# Patient Record
Sex: MALE | Race: WHITE | NOT HISPANIC OR LATINO | Employment: OTHER | URBAN - METROPOLITAN AREA
[De-identification: names, ages, dates, MRNs, and addresses within clinical notes are randomized per-mention and may not be internally consistent; named-entity substitution may affect disease eponyms.]

---

## 2023-03-17 ENCOUNTER — TELEPHONE (OUTPATIENT)
Dept: PRIMARY CARE | Facility: CLINIC | Age: 68
End: 2023-03-17
Payer: MEDICARE

## 2023-03-17 DIAGNOSIS — E11.69 TYPE 2 DIABETES MELLITUS WITH OTHER SPECIFIED COMPLICATION, WITHOUT LONG-TERM CURRENT USE OF INSULIN (MULTI): Primary | ICD-10-CM

## 2023-03-17 PROBLEM — E11.9 DIABETES (MULTI): Status: ACTIVE | Noted: 2023-03-17

## 2023-03-17 PROBLEM — E11.319 DIABETIC RETINOPATHY (MULTI): Status: ACTIVE | Noted: 2023-03-17

## 2023-03-17 PROBLEM — R80.9 MICROALBUMINURIA: Status: ACTIVE | Noted: 2023-03-17

## 2023-03-17 RX ORDER — BLOOD-GLUCOSE,RECEIVER,CONT
EACH MISCELLANEOUS
Qty: 1 EACH | Refills: 0 | Status: SHIPPED | OUTPATIENT
Start: 2023-03-17 | End: 2023-04-12

## 2023-03-17 RX ORDER — BLOOD-GLUCOSE TRANSMITTER
EACH MISCELLANEOUS
Qty: 1 EACH | Refills: 0 | Status: SHIPPED | OUTPATIENT
Start: 2023-03-17 | End: 2023-04-12

## 2023-03-17 RX ORDER — BLOOD-GLUCOSE SENSOR
1 EACH MISCELLANEOUS
Qty: 1 EACH | Refills: 11 | Status: SHIPPED | OUTPATIENT
Start: 2023-03-17 | End: 2023-04-12

## 2023-03-17 NOTE — TELEPHONE ENCOUNTER
Pt calling for a [facility] to [facility] [address] [place] for the continuous blood sugar meter that you can stick to your arm.  He does not know if this is approved by his insurance or not but would like the meter and supplies called in.  He is not sure what supplies come with this meter.  thanks

## 2023-03-21 ENCOUNTER — TELEPHONE (OUTPATIENT)
Dept: PRIMARY CARE | Facility: CLINIC | Age: 68
End: 2023-03-21
Payer: MEDICARE

## 2023-03-21 NOTE — TELEPHONE ENCOUNTER
Moni patient   Penny calling for new rx order    Free style cecilio 2 sensors and reader    Dexcom is not covered and this is, so member is ok having it called in.  #90 day supply     Call into [facility] [address] [facility] phone [phone]

## 2023-03-27 DIAGNOSIS — E11.319 DIABETIC RETINOPATHY ASSOCIATED WITH TYPE 2 DIABETES MELLITUS, MACULAR EDEMA PRESENCE UNSPECIFIED, UNSPECIFIED LATERALITY, UNSPECIFIED RETINOPATHY SEVERITY (MULTI): Primary | ICD-10-CM

## 2023-03-27 DIAGNOSIS — E11.69 TYPE 2 DIABETES MELLITUS WITH OTHER SPECIFIED COMPLICATION, WITHOUT LONG-TERM CURRENT USE OF INSULIN (MULTI): ICD-10-CM

## 2023-03-27 RX ORDER — FLASH GLUCOSE SENSOR
KIT MISCELLANEOUS
Qty: 1 EACH | Refills: 0 | Status: SHIPPED | OUTPATIENT
Start: 2023-03-27 | End: 2023-04-12

## 2023-03-29 ENCOUNTER — TELEPHONE (OUTPATIENT)
Dept: PRIMARY CARE | Facility: CLINIC | Age: 68
End: 2023-03-29
Payer: MEDICARE

## 2023-03-29 RX ORDER — HYDROCHLOROTHIAZIDE 12.5 MG/1
12.5 TABLET ORAL DAILY
COMMUNITY
Start: 2023-02-16 | End: 2023-04-07 | Stop reason: SDUPTHER

## 2023-03-29 RX ORDER — AMLODIPINE AND BENAZEPRIL HYDROCHLORIDE 10; 40 MG/1; MG/1
1 CAPSULE ORAL DAILY
COMMUNITY
End: 2023-07-03 | Stop reason: SDUPTHER

## 2023-03-29 RX ORDER — METFORMIN HYDROCHLORIDE 1000 MG/1
1 TABLET ORAL 2 TIMES DAILY
COMMUNITY
Start: 2021-12-17 | End: 2023-07-03 | Stop reason: SDUPTHER

## 2023-03-29 RX ORDER — SILDENAFIL 100 MG/1
100 TABLET, FILM COATED ORAL AS NEEDED
COMMUNITY
End: 2023-04-20 | Stop reason: SDUPTHER

## 2023-03-29 RX ORDER — HYDROCORTISONE 25 MG/G
1 CREAM TOPICAL 2 TIMES DAILY
COMMUNITY
Start: 2023-02-16 | End: 2023-07-03 | Stop reason: ALTCHOICE

## 2023-03-29 NOTE — TELEPHONE ENCOUNTER
The pharm lm asking if you could change the glucose kit to the   Freestyle cecilio 2 sensor. This will go with his reader and is covered

## 2023-04-03 LAB
ALANINE AMINOTRANSFERASE (SGPT) (U/L) IN SER/PLAS: 8 U/L (ref 10–52)
ALBUMIN (G/DL) IN SER/PLAS: 4 G/DL (ref 3.4–5)
ALBUMIN (MG/L) IN URINE: 107.7 MG/L
ALBUMIN/CREATININE (UG/MG) IN URINE: 170.7 UG/MG CRT (ref 0–30)
ALKALINE PHOSPHATASE (U/L) IN SER/PLAS: 46 U/L (ref 33–136)
ANION GAP IN SER/PLAS: 10 MMOL/L (ref 10–20)
ASPARTATE AMINOTRANSFERASE (SGOT) (U/L) IN SER/PLAS: 12 U/L (ref 9–39)
BILIRUBIN TOTAL (MG/DL) IN SER/PLAS: 0.5 MG/DL (ref 0–1.2)
CALCIUM (MG/DL) IN SER/PLAS: 8.9 MG/DL (ref 8.6–10.3)
CARBON DIOXIDE, TOTAL (MMOL/L) IN SER/PLAS: 27 MMOL/L (ref 21–32)
CHLORIDE (MMOL/L) IN SER/PLAS: 99 MMOL/L (ref 98–107)
CHOLESTEROL (MG/DL) IN SER/PLAS: 201 MG/DL (ref 0–199)
CHOLESTEROL IN HDL (MG/DL) IN SER/PLAS: 33.1 MG/DL
CHOLESTEROL/HDL RATIO: 6.1
CREATININE (MG/DL) IN SER/PLAS: 1.43 MG/DL (ref 0.5–1.3)
CREATININE (MG/DL) IN URINE: 63.1 MG/DL (ref 20–370)
ERYTHROCYTE DISTRIBUTION WIDTH (RATIO) BY AUTOMATED COUNT: 12.9 % (ref 11.5–14.5)
ERYTHROCYTE MEAN CORPUSCULAR HEMOGLOBIN CONCENTRATION (G/DL) BY AUTOMATED: 33.6 G/DL (ref 32–36)
ERYTHROCYTE MEAN CORPUSCULAR VOLUME (FL) BY AUTOMATED COUNT: 85 FL (ref 80–100)
ERYTHROCYTES (10*6/UL) IN BLOOD BY AUTOMATED COUNT: 3.91 X10E12/L (ref 4.5–5.9)
ESTIMATED AVERAGE GLUCOSE FOR HBA1C: 171 MG/DL
GFR MALE: 53 ML/MIN/1.73M2
GLUCOSE (MG/DL) IN SER/PLAS: 177 MG/DL (ref 74–99)
HEMATOCRIT (%) IN BLOOD BY AUTOMATED COUNT: 33.3 % (ref 41–52)
HEMOGLOBIN (G/DL) IN BLOOD: 11.2 G/DL (ref 13.5–17.5)
HEMOGLOBIN A1C/HEMOGLOBIN TOTAL IN BLOOD: 7.6 %
LDL: 117 MG/DL (ref 0–99)
LEUKOCYTES (10*3/UL) IN BLOOD BY AUTOMATED COUNT: 6.8 X10E9/L (ref 4.4–11.3)
NON HDL CHOLESTEROL: 168 MG/DL
PLATELETS (10*3/UL) IN BLOOD AUTOMATED COUNT: 274 X10E9/L (ref 150–450)
POTASSIUM (MMOL/L) IN SER/PLAS: 4.4 MMOL/L (ref 3.5–5.3)
PROTEIN TOTAL: 6.6 G/DL (ref 6.4–8.2)
SODIUM (MMOL/L) IN SER/PLAS: 132 MMOL/L (ref 136–145)
TRIGLYCERIDE (MG/DL) IN SER/PLAS: 257 MG/DL (ref 0–149)
UREA NITROGEN (MG/DL) IN SER/PLAS: 28 MG/DL (ref 6–23)
VLDL: 51 MG/DL (ref 0–40)

## 2023-04-07 DIAGNOSIS — I10 PRIMARY HYPERTENSION: ICD-10-CM

## 2023-04-07 DIAGNOSIS — E11.22 TYPE 2 DIABETES MELLITUS WITH STAGE 3 CHRONIC KIDNEY DISEASE, WITHOUT LONG-TERM CURRENT USE OF INSULIN, UNSPECIFIED WHETHER STAGE 3A OR 3B CKD (MULTI): Primary | ICD-10-CM

## 2023-04-07 DIAGNOSIS — N18.30 TYPE 2 DIABETES MELLITUS WITH STAGE 3 CHRONIC KIDNEY DISEASE, WITHOUT LONG-TERM CURRENT USE OF INSULIN, UNSPECIFIED WHETHER STAGE 3A OR 3B CKD (MULTI): Primary | ICD-10-CM

## 2023-04-07 RX ORDER — PIOGLITAZONEHYDROCHLORIDE 15 MG/1
15 TABLET ORAL DAILY
Qty: 90 TABLET | Refills: 3 | Status: SHIPPED | OUTPATIENT
Start: 2023-04-07 | End: 2023-04-12 | Stop reason: SINTOL

## 2023-04-07 RX ORDER — HYDROCHLOROTHIAZIDE 25 MG/1
25 TABLET ORAL DAILY
Qty: 90 TABLET | Refills: 3 | Status: SHIPPED | OUTPATIENT
Start: 2023-04-07 | End: 2023-04-20 | Stop reason: SDUPTHER

## 2023-04-07 NOTE — PROGRESS NOTES
Hydrochlorothiazide working well, feet less swollen bp better    Sbp still in 150's      Will increase hydrochlorothiazide    Will add pioglitazone for diabetes  I spoke to patient today and discussed test results. Questions were answered to their satisfaction. They were told to call or make an appointment if they have any concerns.  Mattie Montenegro D.O.

## 2023-04-12 ENCOUNTER — TELEPHONE (OUTPATIENT)
Dept: PRIMARY CARE | Facility: CLINIC | Age: 68
End: 2023-04-12
Payer: MEDICARE

## 2023-04-12 DIAGNOSIS — E11.39 TYPE 2 DIABETES MELLITUS WITH OTHER OPHTHALMIC COMPLICATION, WITHOUT LONG-TERM CURRENT USE OF INSULIN (MULTI): Primary | ICD-10-CM

## 2023-04-12 NOTE — TELEPHONE ENCOUNTER
Pt called c/o bilateral leg swelling since starting new meds. Pt thinks R leg is worse than L leg. Pt can be reached at [phone]

## 2023-04-17 ENCOUNTER — TELEPHONE (OUTPATIENT)
Dept: PRIMARY CARE | Facility: CLINIC | Age: 68
End: 2023-04-17
Payer: MEDICARE

## 2023-04-20 ENCOUNTER — OFFICE VISIT (OUTPATIENT)
Dept: PRIMARY CARE | Facility: CLINIC | Age: 68
End: 2023-04-20
Payer: MEDICARE

## 2023-04-20 VITALS
DIASTOLIC BLOOD PRESSURE: 96 MMHG | TEMPERATURE: 97.3 F | HEART RATE: 90 BPM | WEIGHT: 242 LBS | BODY MASS INDEX: 32.82 KG/M2 | SYSTOLIC BLOOD PRESSURE: 170 MMHG

## 2023-04-20 DIAGNOSIS — N18.31 STAGE 3A CHRONIC KIDNEY DISEASE (MULTI): ICD-10-CM

## 2023-04-20 DIAGNOSIS — I10 PRIMARY HYPERTENSION: Primary | ICD-10-CM

## 2023-04-20 DIAGNOSIS — E11.39 TYPE 2 DIABETES MELLITUS WITH OTHER OPHTHALMIC COMPLICATION, WITHOUT LONG-TERM CURRENT USE OF INSULIN (MULTI): ICD-10-CM

## 2023-04-20 DIAGNOSIS — E11.319 DIABETIC RETINOPATHY ASSOCIATED WITH TYPE 2 DIABETES MELLITUS, MACULAR EDEMA PRESENCE UNSPECIFIED, UNSPECIFIED LATERALITY, UNSPECIFIED RETINOPATHY SEVERITY (MULTI): ICD-10-CM

## 2023-04-20 DIAGNOSIS — R80.9 MICROALBUMINURIA: ICD-10-CM

## 2023-04-20 DIAGNOSIS — N52.9 ERECTILE DYSFUNCTION, UNSPECIFIED ERECTILE DYSFUNCTION TYPE: ICD-10-CM

## 2023-04-20 DIAGNOSIS — E11.319 TYPE 2 DIABETES MELLITUS WITH RETINOPATHY, WITHOUT LONG-TERM CURRENT USE OF INSULIN, MACULAR EDEMA PRESENCE UNSPECIFIED, UNSPECIFIED LATERALITY, UNSPECIFIED RETINOPATHY SEVERITY (MULTI): ICD-10-CM

## 2023-04-20 LAB — POC FINGERSTICK BLOOD GLUCOSE: 258 MG/DL (ref 70–100)

## 2023-04-20 PROCEDURE — 3051F HG A1C>EQUAL 7.0%<8.0%: CPT | Performed by: FAMILY MEDICINE

## 2023-04-20 PROCEDURE — 1160F RVW MEDS BY RX/DR IN RCRD: CPT | Performed by: FAMILY MEDICINE

## 2023-04-20 PROCEDURE — 99214 OFFICE O/P EST MOD 30 MIN: CPT | Performed by: FAMILY MEDICINE

## 2023-04-20 PROCEDURE — 1159F MED LIST DOCD IN RCRD: CPT | Performed by: FAMILY MEDICINE

## 2023-04-20 PROCEDURE — 3077F SYST BP >= 140 MM HG: CPT | Performed by: FAMILY MEDICINE

## 2023-04-20 PROCEDURE — 3080F DIAST BP >= 90 MM HG: CPT | Performed by: FAMILY MEDICINE

## 2023-04-20 PROCEDURE — 82962 GLUCOSE BLOOD TEST: CPT | Performed by: FAMILY MEDICINE

## 2023-04-20 RX ORDER — PIOGLITAZONEHYDROCHLORIDE 30 MG/1
1 TABLET ORAL DAILY
COMMUNITY
Start: 2022-05-13 | End: 2023-04-20 | Stop reason: SINTOL

## 2023-04-20 RX ORDER — HYDROCHLOROTHIAZIDE 25 MG/1
25 TABLET ORAL DAILY
Qty: 90 TABLET | Refills: 3 | Status: SHIPPED | OUTPATIENT
Start: 2023-04-20 | End: 2023-07-03 | Stop reason: SDUPTHER

## 2023-04-20 RX ORDER — SILDENAFIL 100 MG/1
100 TABLET, FILM COATED ORAL AS NEEDED
Qty: 10 TABLET | Refills: 2 | Status: SHIPPED | OUTPATIENT
Start: 2023-04-20

## 2023-04-20 NOTE — PROGRESS NOTES
Subjective    David Ortiz is a 67 y.o. male who presents for Medication Problem (Discuss meds).    Le edema with actos, told top stop that and start jardiance, wanted ot try samples first before filling rx but we have no samples, will plan to fill rx and atrt that med    Stopped hydrochlorothiazide too, thought it was also causing swelling, will retarat         Objective   BP (!) 170/96   Pulse 90   Temp 36.3 °C (97.3 °F)   Wt 110 kg (242 lb)   BMI 32.82 kg/m²   See 4/3/ labs  Physical Exam  Vitals reviewed.   Constitutional:       General: He is not in acute distress.     Appearance: Normal appearance.   HENT:      Head: Normocephalic and atraumatic.   Eyes:      General: No scleral icterus.     Conjunctiva/sclera: Conjunctivae normal.   Cardiovascular:      Rate and Rhythm: Normal rate and regular rhythm.      Heart sounds: No murmur heard.  Pulmonary:      Effort: Pulmonary effort is normal.      Breath sounds: No wheezing, rhonchi or rales.   Musculoskeletal:      Right lower leg: No edema.      Left lower leg: No edema.   Skin:     General: Skin is warm and dry.      Findings: No rash.   Neurological:      General: No focal deficit present.      Mental Status: He is alert.      Gait: Gait normal.   Psychiatric:         Mood and Affect: Mood normal.         Behavior: Behavior normal.         Assessment/Plan   Problem List Items Addressed This Visit          Circulatory    Primary hypertension - Primary    Relevant Medications    hydroCHLOROthiazide (HYDRODiuril) 25 mg tablet       Genitourinary    Stage 3a chronic kidney disease       Endocrine/Metabolic    Diabetes (CMS/[facility])    Relevant Medications    empagliflozin (Jardiance) 25 mg    Other Relevant Orders    POCT Fingerstick Glucose manually resulted (Completed)    Referral to Diabetic Education    Diabetic retinopathy (CMS/[facility])       Other    Microalbuminuria     Other Visit Diagnoses       Erectile dysfunction, unspecified erectile dysfunction type         Relevant Medications    sildenafil (Viagra) 100 mg tablet        Medication risks and benefits discussed.  Patient advised to follow up in 3mo as needed or if any concerns arise.

## 2023-05-09 ENCOUNTER — TELEPHONE (OUTPATIENT)
Dept: PRIMARY CARE | Facility: CLINIC | Age: 68
End: 2023-05-09
Payer: MEDICARE

## 2023-05-09 DIAGNOSIS — E11.69 TYPE 2 DIABETES MELLITUS WITH OTHER SPECIFIED COMPLICATION, WITHOUT LONG-TERM CURRENT USE OF INSULIN (MULTI): ICD-10-CM

## 2023-05-09 DIAGNOSIS — E11.319 DIABETIC RETINOPATHY ASSOCIATED WITH TYPE 2 DIABETES MELLITUS, MACULAR EDEMA PRESENCE UNSPECIFIED, UNSPECIFIED LATERALITY, UNSPECIFIED RETINOPATHY SEVERITY (MULTI): ICD-10-CM

## 2023-05-09 DIAGNOSIS — I10 PRIMARY HYPERTENSION: ICD-10-CM

## 2023-05-09 NOTE — TELEPHONE ENCOUNTER
His insurance asked if you could send a 90d script for the   Freestyle Cindy 2 sensors    Im not sure how often you change them so you will need to fix directions :)

## 2023-05-16 ENCOUNTER — TELEPHONE (OUTPATIENT)
Dept: PRIMARY CARE | Facility: CLINIC | Age: 68
End: 2023-05-16
Payer: MEDICARE

## 2023-05-16 NOTE — TELEPHONE ENCOUNTER
Pt request Free Style glucose sensor be sent to local pharm: [facility] [place]. He left a message last week but it still has not been called in. Once rx is sent to pharm pt would like Sophie to help him stick it on his arm.

## 2023-05-19 ENCOUNTER — OFFICE VISIT (OUTPATIENT)
Dept: PRIMARY CARE | Facility: CLINIC | Age: 68
End: 2023-05-19
Payer: MEDICARE

## 2023-05-19 VITALS
OXYGEN SATURATION: 93 % | SYSTOLIC BLOOD PRESSURE: 138 MMHG | HEART RATE: 87 BPM | TEMPERATURE: 98 F | WEIGHT: 233 LBS | DIASTOLIC BLOOD PRESSURE: 82 MMHG | HEIGHT: 72 IN | BODY MASS INDEX: 31.56 KG/M2

## 2023-05-19 DIAGNOSIS — E11.319 DIABETIC RETINOPATHY ASSOCIATED WITH TYPE 2 DIABETES MELLITUS, MACULAR EDEMA PRESENCE UNSPECIFIED, UNSPECIFIED LATERALITY, UNSPECIFIED RETINOPATHY SEVERITY (MULTI): ICD-10-CM

## 2023-05-19 DIAGNOSIS — M54.42 ACUTE LEFT-SIDED LOW BACK PAIN WITH LEFT-SIDED SCIATICA: Primary | ICD-10-CM

## 2023-05-19 DIAGNOSIS — E11.69 TYPE 2 DIABETES MELLITUS WITH OTHER SPECIFIED COMPLICATION, WITHOUT LONG-TERM CURRENT USE OF INSULIN (MULTI): ICD-10-CM

## 2023-05-19 DIAGNOSIS — I10 PRIMARY HYPERTENSION: ICD-10-CM

## 2023-05-19 PROCEDURE — 3075F SYST BP GE 130 - 139MM HG: CPT | Performed by: STUDENT IN AN ORGANIZED HEALTH CARE EDUCATION/TRAINING PROGRAM

## 2023-05-19 PROCEDURE — 4010F ACE/ARB THERAPY RXD/TAKEN: CPT | Performed by: STUDENT IN AN ORGANIZED HEALTH CARE EDUCATION/TRAINING PROGRAM

## 2023-05-19 PROCEDURE — 3051F HG A1C>EQUAL 7.0%<8.0%: CPT | Performed by: STUDENT IN AN ORGANIZED HEALTH CARE EDUCATION/TRAINING PROGRAM

## 2023-05-19 PROCEDURE — 1159F MED LIST DOCD IN RCRD: CPT | Performed by: STUDENT IN AN ORGANIZED HEALTH CARE EDUCATION/TRAINING PROGRAM

## 2023-05-19 PROCEDURE — 1160F RVW MEDS BY RX/DR IN RCRD: CPT | Performed by: STUDENT IN AN ORGANIZED HEALTH CARE EDUCATION/TRAINING PROGRAM

## 2023-05-19 PROCEDURE — 99213 OFFICE O/P EST LOW 20 MIN: CPT | Performed by: STUDENT IN AN ORGANIZED HEALTH CARE EDUCATION/TRAINING PROGRAM

## 2023-05-19 PROCEDURE — 3079F DIAST BP 80-89 MM HG: CPT | Performed by: STUDENT IN AN ORGANIZED HEALTH CARE EDUCATION/TRAINING PROGRAM

## 2023-05-19 RX ORDER — AMLODIPINE BESYLATE 10 MG/1
TABLET ORAL
COMMUNITY
Start: 2021-11-01 | End: 2023-07-03 | Stop reason: ALTCHOICE

## 2023-05-19 RX ORDER — LISINOPRIL 5 MG/1
TABLET ORAL
COMMUNITY
Start: 2021-12-17 | End: 2023-07-03 | Stop reason: ALTCHOICE

## 2023-05-19 RX ORDER — ALPRAZOLAM 0.5 MG/1
TABLET ORAL
COMMUNITY
Start: 2008-01-30 | End: 2023-12-19 | Stop reason: WASHOUT

## 2023-05-19 RX ORDER — BACLOFEN 10 MG/1
10 TABLET ORAL 2 TIMES DAILY PRN
Qty: 14 TABLET | Refills: 0 | Status: SHIPPED | OUTPATIENT
Start: 2023-05-19 | End: 2023-07-03 | Stop reason: ALTCHOICE

## 2023-05-19 RX ORDER — VALSARTAN AND HYDROCHLOROTHIAZIDE 160; 12.5 MG/1; MG/1
TABLET, FILM COATED ORAL
COMMUNITY
Start: 2021-11-03 | End: 2023-07-03 | Stop reason: ALTCHOICE

## 2023-05-19 RX ORDER — COLCHICINE 0.6 MG/1
TABLET ORAL
COMMUNITY
Start: 2008-06-09 | End: 2023-12-19 | Stop reason: WASHOUT

## 2023-05-19 RX ORDER — MELOXICAM 15 MG/1
15 TABLET ORAL DAILY PRN
Qty: 14 TABLET | Refills: 0 | Status: SHIPPED | OUTPATIENT
Start: 2023-05-19 | End: 2023-06-02

## 2023-05-19 ASSESSMENT — ENCOUNTER SYMPTOMS
VOMITING: 0
CHILLS: 0
LIGHT-HEADEDNESS: 0
COUGH: 0
AGITATION: 0
NECK STIFFNESS: 0
DIZZINESS: 0
ADENOPATHY: 0
NECK PAIN: 0
NAUSEA: 0
HEADACHES: 0
BRUISES/BLEEDS EASILY: 0
BACK PAIN: 1
DYSURIA: 0
BLOOD IN STOOL: 0
FLANK PAIN: 0
HEMATURIA: 0
WHEEZING: 0
FATIGUE: 0
WEAKNESS: 0
ABDOMINAL PAIN: 0
SHORTNESS OF BREATH: 0
NUMBNESS: 0
FEVER: 0
CONFUSION: 0
DIARRHEA: 0
CONSTIPATION: 0

## 2023-05-19 NOTE — PROGRESS NOTES
I reviewed with the resident the medical history and the resident’s findings on physical examination.  I discussed with the resident the patient’s diagnosis and concur with the treatment plan as documented in the resident note.     Mansoor Dickinson MD

## 2023-05-19 NOTE — PROGRESS NOTES
Subjective   Patient ID: David Ortiz is a 68 y.o. male who presents for Back Pain (Back pain x 3 weeks/Walking 8/10 pain /).    Patient presenting for left-sided low back pain that started roughly 3 weeks ago.  He states that he experienced a similar back pain roughly 2 months ago that self resolved after 3 weeks.  States pain is 8/10 at its worst, only present when standing and walking for long periods of time.  He does state pain will radiate down the back of his left leg proximal to his knee.  Denies any saddle paresthesias, bowel/bladder incontinence, or numbness/tingling/weakness in bilateral lower extremities.  He states that 2 months ago he was lifting a box of water bottles when he initially felt this back pain, then had similar back pain after he fell off of a chair, catching himself.  Denies any blunt trauma to his back.  Patient has not been taking any medications for his pain.         Review of Systems   Constitutional:  Negative for chills, fatigue and fever.   Eyes:  Negative for visual disturbance.   Respiratory:  Negative for cough, shortness of breath and wheezing.    Cardiovascular:  Negative for chest pain and leg swelling.   Gastrointestinal:  Negative for abdominal pain, blood in stool, constipation, diarrhea, nausea and vomiting.   Endocrine: Negative for polyuria.   Genitourinary:  Negative for decreased urine volume, dysuria, flank pain and hematuria.   Musculoskeletal:  Positive for back pain. Negative for gait problem, neck pain and neck stiffness.   Skin:  Negative for rash.   Allergic/Immunologic: Negative for environmental allergies, food allergies and immunocompromised state.   Neurological:  Negative for dizziness, weakness, light-headedness, numbness and headaches.   Hematological:  Negative for adenopathy. Does not bruise/bleed easily.   Psychiatric/Behavioral:  Negative for agitation, behavioral problems and confusion.    All other systems reviewed and are  negative.      Objective   /82 (BP Location: Right arm, Patient Position: Sitting, BP Cuff Size: Adult)   Pulse 87   Temp 36.7 °C (98 °F) (Temporal)   Ht 1.829 m (6')   Wt 106 kg (233 lb)   SpO2 93%   BMI 31.60 kg/m²     Physical Exam  Vitals and nursing note reviewed.   Constitutional:       General: He is not in acute distress.     Appearance: Normal appearance. He is not toxic-appearing.   HENT:      Head: Normocephalic and atraumatic.      Right Ear: External ear normal.      Left Ear: External ear normal.      Nose: Nose normal.      Mouth/Throat:      Mouth: Mucous membranes are moist.      Pharynx: Oropharynx is clear.   Eyes:      Extraocular Movements: Extraocular movements intact.      Conjunctiva/sclera: Conjunctivae normal.      Pupils: Pupils are equal, round, and reactive to light.   Cardiovascular:      Rate and Rhythm: Normal rate and regular rhythm.      Pulses: Normal pulses.      Heart sounds: No murmur heard.  Pulmonary:      Effort: Pulmonary effort is normal.      Breath sounds: Normal breath sounds.   Abdominal:      General: Abdomen is flat. There is no distension.      Palpations: Abdomen is soft.   Musculoskeletal:         General: Normal range of motion.      Cervical back: Normal range of motion and neck supple. No rigidity or tenderness.      Thoracic back: No tenderness or bony tenderness. Normal range of motion.      Lumbar back: No spasms, tenderness or bony tenderness. Normal range of motion. Negative right straight leg raise test and negative left straight leg raise test.      Right lower leg: No edema.      Left lower leg: No edema.   Skin:     General: Skin is warm and dry.      Coloration: Skin is not jaundiced.   Neurological:      General: No focal deficit present.      Mental Status: He is alert and oriented to person, place, and time.      Sensory: No sensory deficit.      Motor: No weakness.   Psychiatric:         Mood and Affect: Mood normal.         Behavior:  Behavior normal.         Thought Content: Thought content normal.         Judgment: Judgment normal.         Assessment/Plan   Problem List Items Addressed This Visit    None  Visit Diagnoses       Acute left-sided low back pain with left-sided sciatica    -  Primary    Relevant Medications    meloxicam (Mobic) 15 mg tablet    baclofen (Lioresal) 10 mg tablet        Left-sided low back pain with associated sciatic pain x3 weeks.  Physical exam unremarkable, negative straight leg raise bilaterally, and unable to elicit low back pain on exam, though patient states it is present with long periods of activity while standing.  No midline spinal tenderness.  Given unremarkable exam, no history of blunt trauma, will defer x-ray of spine at this time.  Will start on anti-inflammatories with meloxicam to be taken as needed in addition to muscle relaxer baclofen as needed.  Home exercises for spine conditioning provided and encourage patient to do these exercises daily.  Plan for follow-up in 2 to 4 weeks if no improvement of pain.  We will consider obtaining x-ray of lumbar spine at that time.  Suspect left lumbar muscle strain at this time.  Advised patient go to the ER if experiencing any bowel/bladder incontinence, saddle paresthesias, or any other new or concerning symptoms.

## 2023-07-03 ENCOUNTER — OFFICE VISIT (OUTPATIENT)
Dept: PRIMARY CARE | Facility: CLINIC | Age: 68
End: 2023-07-03
Payer: MEDICARE

## 2023-07-03 VITALS
TEMPERATURE: 98.1 F | SYSTOLIC BLOOD PRESSURE: 155 MMHG | HEIGHT: 72 IN | BODY MASS INDEX: 31.18 KG/M2 | HEART RATE: 77 BPM | DIASTOLIC BLOOD PRESSURE: 85 MMHG | WEIGHT: 230.2 LBS

## 2023-07-03 DIAGNOSIS — Z00.00 ROUTINE GENERAL MEDICAL EXAMINATION AT HEALTH CARE FACILITY: ICD-10-CM

## 2023-07-03 DIAGNOSIS — R80.9 MICROALBUMINURIA: ICD-10-CM

## 2023-07-03 DIAGNOSIS — E11.39 TYPE 2 DIABETES MELLITUS WITH OTHER OPHTHALMIC COMPLICATION, WITHOUT LONG-TERM CURRENT USE OF INSULIN (MULTI): ICD-10-CM

## 2023-07-03 DIAGNOSIS — I10 PRIMARY HYPERTENSION: ICD-10-CM

## 2023-07-03 DIAGNOSIS — E11.319 TYPE 2 DIABETES MELLITUS WITH RETINOPATHY, WITHOUT LONG-TERM CURRENT USE OF INSULIN, MACULAR EDEMA PRESENCE UNSPECIFIED, UNSPECIFIED LATERALITY, UNSPECIFIED RETINOPATHY SEVERITY (MULTI): Primary | ICD-10-CM

## 2023-07-03 DIAGNOSIS — E11.319 DIABETIC RETINOPATHY ASSOCIATED WITH TYPE 2 DIABETES MELLITUS, MACULAR EDEMA PRESENCE UNSPECIFIED, UNSPECIFIED LATERALITY, UNSPECIFIED RETINOPATHY SEVERITY (MULTI): ICD-10-CM

## 2023-07-03 DIAGNOSIS — N18.31 STAGE 3A CHRONIC KIDNEY DISEASE (MULTI): ICD-10-CM

## 2023-07-03 LAB — POC HEMOGLOBIN A1C: 6.8 % (ref 4.2–6.5)

## 2023-07-03 PROCEDURE — G0439 PPPS, SUBSEQ VISIT: HCPCS | Performed by: FAMILY MEDICINE

## 2023-07-03 PROCEDURE — 3079F DIAST BP 80-89 MM HG: CPT | Performed by: FAMILY MEDICINE

## 2023-07-03 PROCEDURE — 3077F SYST BP >= 140 MM HG: CPT | Performed by: FAMILY MEDICINE

## 2023-07-03 PROCEDURE — 1160F RVW MEDS BY RX/DR IN RCRD: CPT | Performed by: FAMILY MEDICINE

## 2023-07-03 PROCEDURE — 1170F FXNL STATUS ASSESSED: CPT | Performed by: FAMILY MEDICINE

## 2023-07-03 PROCEDURE — 1036F TOBACCO NON-USER: CPT | Performed by: FAMILY MEDICINE

## 2023-07-03 PROCEDURE — 99397 PER PM REEVAL EST PAT 65+ YR: CPT | Performed by: FAMILY MEDICINE

## 2023-07-03 PROCEDURE — 1159F MED LIST DOCD IN RCRD: CPT | Performed by: FAMILY MEDICINE

## 2023-07-03 PROCEDURE — 3051F HG A1C>EQUAL 7.0%<8.0%: CPT | Performed by: FAMILY MEDICINE

## 2023-07-03 PROCEDURE — 83036 HEMOGLOBIN GLYCOSYLATED A1C: CPT | Performed by: FAMILY MEDICINE

## 2023-07-03 RX ORDER — METFORMIN HYDROCHLORIDE 1000 MG/1
1000 TABLET ORAL 2 TIMES DAILY
Qty: 180 TABLET | Refills: 3 | Status: SHIPPED | OUTPATIENT
Start: 2023-07-03 | End: 2023-07-03 | Stop reason: SDUPTHER

## 2023-07-03 RX ORDER — METFORMIN HYDROCHLORIDE 1000 MG/1
1000 TABLET ORAL DAILY
Qty: 90 TABLET | Refills: 3 | Status: SHIPPED | OUTPATIENT
Start: 2023-07-03 | End: 2023-08-15 | Stop reason: ALTCHOICE

## 2023-07-03 RX ORDER — AMLODIPINE AND BENAZEPRIL HYDROCHLORIDE 10; 40 MG/1; MG/1
1 CAPSULE ORAL DAILY
Qty: 90 CAPSULE | Refills: 3 | Status: SHIPPED | OUTPATIENT
Start: 2023-07-03 | End: 2023-12-19 | Stop reason: SDUPTHER

## 2023-07-03 RX ORDER — HYDROCHLOROTHIAZIDE 50 MG/1
50 TABLET ORAL DAILY
Qty: 90 TABLET | Refills: 3 | Status: SHIPPED | OUTPATIENT
Start: 2023-07-03 | End: 2023-12-19 | Stop reason: SDUPTHER

## 2023-07-03 ASSESSMENT — ACTIVITIES OF DAILY LIVING (ADL)
GROCERY_SHOPPING: INDEPENDENT
BATHING: INDEPENDENT
DRESSING: INDEPENDENT
MANAGING_FINANCES: INDEPENDENT
TAKING_MEDICATION: INDEPENDENT
DOING_HOUSEWORK: INDEPENDENT

## 2023-07-03 ASSESSMENT — PATIENT HEALTH QUESTIONNAIRE - PHQ9
2. FEELING DOWN, DEPRESSED OR HOPELESS: NOT AT ALL
1. LITTLE INTEREST OR PLEASURE IN DOING THINGS: NOT AT ALL
SUM OF ALL RESPONSES TO PHQ9 QUESTIONS 1 AND 2: 0

## 2023-07-03 NOTE — PROGRESS NOTES
Subjective   Reason for Visit: David Ortiz is an 68 y.o. male here for a Medicare Wellness visit.          Reviewed all medications by prescribing practitioner or clinical pharmacist (such as prescriptions, OTCs, herbal therapies and supplements) and documented in the medical record.    Had back pain, went to chiropractor and it helped a lot    Neuropathy of feet, makesit hard ot ride motorcycle, check feet every day  Has hadfor years        Patient Care Team:  Mattie Montenegro, DO as PCP - General       Objective   Vitals:  /85   Pulse 77   Temp 36.7 °C (98.1 °F)   Ht 1.829 m (6')   Wt 104 kg (230 lb 3.2 oz)   BMI 31.22 kg/m²       Physical Exam  Vitals reviewed.   Constitutional:       General: He is not in acute distress.     Appearance: Normal appearance.   HENT:      Head: Normocephalic and atraumatic.      Right Ear: Tympanic membrane and ear canal normal.      Left Ear: Tympanic membrane and ear canal normal.   Eyes:      General: No scleral icterus.     Conjunctiva/sclera: Conjunctivae normal.   Cardiovascular:      Rate and Rhythm: Normal rate and regular rhythm.      Heart sounds: No murmur heard.  Pulmonary:      Effort: Pulmonary effort is normal.      Breath sounds: No wheezing, rhonchi or rales.   Abdominal:      General: Bowel sounds are normal.      Palpations: Abdomen is soft.      Tenderness: There is no abdominal tenderness.   Musculoskeletal:      Right lower leg: No edema.      Left lower leg: No edema.   Skin:     General: Skin is warm and dry.      Findings: No rash.   Neurological:      General: No focal deficit present.      Mental Status: He is alert.      Gait: Gait normal.   Psychiatric:         Mood and Affect: Mood normal.         Behavior: Behavior normal.         Assessment/Plan   Problem List Items Addressed This Visit       Diabetes (CMS/[facility]) - Primary    Overview     Failed pioglitazone- le edema         Relevant Medications    empagliflozin (Jardiance) 25 mg     metFORMIN (Glucophage) 1,000 mg tablet    Other Relevant Orders    POCT glycosylated hemoglobin (Hb A1C) manually resulted (Completed)    Referral to Nutrition Services    Diabetic retinopathy (CMS/[facility])    Microalbuminuria    Primary hypertension    Relevant Medications    amLODIPine-benazepriL (Lotrel) 10-40 mg capsule    hydroCHLOROthiazide (HYDRODiuril) 50 mg tablet    Stage 3a chronic kidney disease     Other Visit Diagnoses       Routine general medical examination at health care facility        Relevant Orders    1 Year Follow Up In [facility] - PCP - Wellness Exam        Increased hydrochlorothiazide to 50mg daily\    Current Plans  · You are advised to get a flu shot annually  · You should eat a healthy diet and get regular exercise.  · You should see your dentist regularly every 6 months for dental health checkups unless you have had your teeth removed.  · Follow up in 3 months for bp recheck or as needed

## 2023-08-15 ENCOUNTER — OFFICE VISIT (OUTPATIENT)
Dept: PRIMARY CARE | Facility: CLINIC | Age: 68
End: 2023-08-15
Payer: MEDICARE

## 2023-08-15 VITALS
SYSTOLIC BLOOD PRESSURE: 136 MMHG | BODY MASS INDEX: 31.36 KG/M2 | TEMPERATURE: 97.4 F | WEIGHT: 231.2 LBS | HEART RATE: 85 BPM | OXYGEN SATURATION: 96 % | DIASTOLIC BLOOD PRESSURE: 76 MMHG | RESPIRATION RATE: 16 BRPM

## 2023-08-15 DIAGNOSIS — E11.69 TYPE 2 DIABETES MELLITUS WITH OTHER SPECIFIED COMPLICATION, WITHOUT LONG-TERM CURRENT USE OF INSULIN (MULTI): ICD-10-CM

## 2023-08-15 DIAGNOSIS — E11.3592 TYPE 2 DIABETES MELLITUS WITH LEFT EYE AFFECTED BY PROLIFERATIVE RETINOPATHY WITHOUT MACULAR EDEMA, WITHOUT LONG-TERM CURRENT USE OF INSULIN (MULTI): ICD-10-CM

## 2023-08-15 DIAGNOSIS — E11.319 DIABETIC RETINOPATHY ASSOCIATED WITH TYPE 2 DIABETES MELLITUS, MACULAR EDEMA PRESENCE UNSPECIFIED, UNSPECIFIED LATERALITY, UNSPECIFIED RETINOPATHY SEVERITY (MULTI): ICD-10-CM

## 2023-08-15 DIAGNOSIS — R79.89 ELEVATED SERUM CREATININE: Primary | ICD-10-CM

## 2023-08-15 PROCEDURE — 1036F TOBACCO NON-USER: CPT | Performed by: INTERNAL MEDICINE

## 2023-08-15 PROCEDURE — 3078F DIAST BP <80 MM HG: CPT | Performed by: INTERNAL MEDICINE

## 2023-08-15 PROCEDURE — 3051F HG A1C>EQUAL 7.0%<8.0%: CPT | Performed by: INTERNAL MEDICINE

## 2023-08-15 PROCEDURE — 3075F SYST BP GE 130 - 139MM HG: CPT | Performed by: INTERNAL MEDICINE

## 2023-08-15 PROCEDURE — 1160F RVW MEDS BY RX/DR IN RCRD: CPT | Performed by: INTERNAL MEDICINE

## 2023-08-15 PROCEDURE — 99204 OFFICE O/P NEW MOD 45 MIN: CPT | Performed by: INTERNAL MEDICINE

## 2023-08-15 PROCEDURE — 1159F MED LIST DOCD IN RCRD: CPT | Performed by: INTERNAL MEDICINE

## 2023-08-15 RX ORDER — METFORMIN HYDROCHLORIDE 500 MG/1
500 TABLET, EXTENDED RELEASE ORAL
Qty: 30 TABLET | Refills: 11 | Status: SHIPPED | OUTPATIENT
Start: 2023-08-15 | End: 2023-12-19 | Stop reason: SDUPTHER

## 2023-08-15 NOTE — PROGRESS NOTES
Subjective   Patient ID: [id number]     David Ortiz is a 68 y.o. male who presents for New Patient Visit.    Current Outpatient Medications:     amLODIPine-benazepriL (Lotrel) 10-40 mg capsule, Take 1 capsule by mouth once daily., Disp: 90 capsule, Rfl: 3    hydroCHLOROthiazide (HYDRODiuril) 50 mg tablet, Take 1 tablet (50 mg) by mouth once daily. (Patient taking differently: Take 0.5 tablets (25 mg) by mouth once daily.), Disp: 90 tablet, Rfl: 3    sildenafil (Viagra) 100 mg tablet, Take 1 tablet (100 mg) by mouth if needed for erectile dysfunction., Disp: 10 tablet, Rfl: 2    ALPRAZolam (Xanax) 0.5 mg tablet, Take by mouth., Disp: , Rfl:     colchicine 0.6 mg tablet, Take by mouth., Disp: , Rfl:     empagliflozin (Jardiance) 25 mg, Take 1 tablet (25 mg) by mouth once daily. (Patient not taking: Reported on 8/15/2023), Disp: 90 tablet, Rfl: 3    FreeStyle Cindy sensor system kit, Use as instructed, Disp: 1 each, Rfl: 0    metFORMIN XR (Glucophage-XR) 500 mg 24 hr tablet, Take 1 tablet (500 mg) by mouth once daily with a meal. Do not crush, chew, or split., Disp: 30 tablet, Rfl: 11  HPI  68-year-old male patient presenting to the office today to establish care.  Patient is known to have history of hypertension and diabetes mellitus type 2 on oral hypoglycemic agents.  Patient did take himself off of Jardiance and lowered the dose of his metformin when he started feeling dizzy and lightheaded and had weakness after he did that he felt significantly better right now he is only taking 1000 mg of metformin and he is not on Jardiance.  His last hemoglobin A1c was excellent it was done in July.    He denies any chest pain or shortness of breath he denies any abdominal pain nausea or vomiting he is requesting referral to see nutrition specialist for his diabetes.  He did significantly modify his diet and he is very proud of his achievements.  Review of system was reviewed all normal except what is noted in HPI   Past  Medical History:   Diagnosis Date    Personal history of other diseases of the circulatory system 04/14/2022    History of uncontrolled hypertension    Personal history of other diseases of the circulatory system 04/14/2022    History of uncontrolled hypertension      Objective   /76   Pulse 85   Temp 36.3 °C (97.4 °F)   Resp 16   Wt 105 kg (231 lb 3.2 oz)   SpO2 96%   BMI 31.36 kg/m²      Physical Exam  Constitutional:       Appearance: Normal appearance.   Cardiovascular:      Rate and Rhythm: Normal rate and regular rhythm.      Pulses: Normal pulses.      Heart sounds: Normal heart sounds.   Pulmonary:      Effort: Pulmonary effort is normal.      Breath sounds: Normal breath sounds.   Neurological:      Mental Status: He is alert.         Assessment/Plan   Problem List Items Addressed This Visit          Endocrine/Metabolic    Diabetes (CMS/[facility])    Relevant Medications    FreeStyle Cindy sensor system kit    metFORMIN XR (Glucophage-XR) 500 mg 24 hr tablet    Other Relevant Orders    Referral to Nutrition Services    Follow Up In [facility] - PCP - Established       Eye    Diabetic retinopathy (CMS/[facility])    Relevant Medications    FreeStyle Cindy sensor system kit     Other Visit Diagnoses       Elevated serum creatinine    -  Primary    Relevant Orders    Basic metabolic panel          68-year-old patient with the following issues.    1.  Diabetes mellitus type 2 on oral hypoglycemic agents, currently the patient is doing significantly better from a hemoglobin A1c perspective.  His last A1c was 6.8, since then he discontinued Jardiance and is taking metformin 1000 mg daily because of side effects but he did experience a mild they are completely resolved.  At this point we decided to repeat his A1c in October and do further assessment regarding his medications as needed.    2.  Essential benign hypertension, patient will continue on his current medications no changes.    3.  Elevated  creatinine, repeat kidney function will be obtained today to assess if there is any concerns regarding the kidney.  We will discuss results with the patient once available.    Disposition, I will see the patient back in the office in October and sooner if needed.  Referral to nutrition services was provided based on his request.  Deborah Sy MD

## 2023-08-17 DIAGNOSIS — E11.319 DIABETIC RETINOPATHY ASSOCIATED WITH TYPE 2 DIABETES MELLITUS, MACULAR EDEMA PRESENCE UNSPECIFIED, UNSPECIFIED LATERALITY, UNSPECIFIED RETINOPATHY SEVERITY (MULTI): ICD-10-CM

## 2023-08-17 DIAGNOSIS — E11.69 TYPE 2 DIABETES MELLITUS WITH OTHER SPECIFIED COMPLICATION, WITHOUT LONG-TERM CURRENT USE OF INSULIN (MULTI): ICD-10-CM

## 2023-08-29 ENCOUNTER — CLINICAL SUPPORT (OUTPATIENT)
Dept: PRIMARY CARE | Facility: CLINIC | Age: 68
End: 2023-08-29
Payer: MEDICARE

## 2023-08-29 NOTE — PROGRESS NOTES
Freestyle Cindy 2 sensor placed on patient's arm on 8/29/2023. Patient noted that he would like to come to the office every two weeks to have his sensor placed as he noted difficulty in placing his sensor on his own previously. Discussed potentially utilizing an alternative CGM system and placing on his stomach for easier at home use.     Currently out of refills on Freestyle Cindy 2 sensors. Discussed having refills sent to [facility] pharmacy on [address].     Per dispense report patient is not currently taking Jardiance 25 mg. Last dispense 6/17/2023 for 30 ds.     Medication Dispense History (from 8/29/2022 to 8/29/2023)  Expand All  Collapse All  amlodipine besylate/benazepril     Dispensed Days Supply Quantity Provider Pharmacy   AMLOD/BENAZ 10-40MG CAP 05/18/2023 90 90 each Mattie Montenegro [facility] [address] ...   AMLOD/BENAZ 10-40MG CAP 02/19/2023 90 90 each Mattie Montenegro [facility] [address] ...   AMLOD/BENAZ 10-40MG CAP 11/22/2022 90 90 each Mattie MEREDITH Montenegro [facility] [address] ...      baclofen     Dispensed Days Supply Quantity Provider Pharmacy   BACLOFEN 10MG       TAB 05/19/2023 7 14 each Tremaine Andre [facility] [address] ...      empagliflozin     Dispensed Days Supply Quantity Provider Pharmacy   JARDIANCE 25MG TAB 06/17/2023 30 30 each Mattie Montenegro [facility] [address] ...   JARDIANCE 25MG TAB 05/18/2023 30 30 each Mattie L Moni DO [facility] [address] ...   JARDIANCE 25MG TAB 04/20/2023 30 30 each Mattie MEREDITH Montenegro [facility] ...      flash glucose scanning reader     Dispensed Days Supply Quantity Provider Pharmacy   FREESTYLE CINDY 2 READER MIS 04/03/2023 30 1 each Mattie Montenegro [facility] ...      flash glucose sensor     Dispensed Days Supply Quantity Provider Pharmacy   FREESTYLE CINDY 2 SENSOR KIT 08/17/2023 28 2 each Deborah Sy MD [facility] [address] ...   FREESTYLE CINDY 2 SENSOR KIT 05/19/2023 90 6 [facility] ...   FREESTYLE DOROTEO 2 SENSOR KIT 03/27/2023 14 1 [facility] ...      hydrochlorothiazide     Dispensed Days Supply Quantity Provider Pharmacy   HYDROCHLOROTHIAZIDE 25MG TAB 05/20/2023 90 90 [facility] ...   HYDROCHLOROTHIAZIDE 12.5MG TAB 05/18/2023 90 90 [facility] ...   HYDROCHLOROTHIAZIDE 25MG TAB 04/07/2023 90 90 [facility] ...   HYDROCHLOROT 12.5MG TAB 02/16/2023 90 90 tablet [facility] ...      hydrocortisone     Dispensed Days Supply Quantity Provider Pharmacy   HYDROCORTISONE 2.5% CRM 02/16/2023 7 28 g [facility] ...      meloxicam     Dispensed Days Supply Quantity Provider Pharmacy   MELOXICAM 15MG      TAB 05/19/2023 14 14 [facility] ...      metformin HCl     Dispensed Days Supply Quantity Provider Pharmacy   METFORMIN ER 500MG  TAB 08/15/2023 90 90 each Deborah Sy MD [facility] ...   METFORMIN 1000MG TAB 06/17/2023 90 180 [facility] ...   METFORMIN 1000MG TAB 11/21/2022 90 180 [facility] ...      pioglitazone HCl     Dispensed Days Supply Quantity Provider Pharmacy   PIOGLITAZONE 15MG TAB 04/07/2023 90 90 [facility] ...      sildenafil citrate     Dispensed Days Supply Quantity Provider Pharmacy   SILDENAFIL 100MG TAB 04/20/2023 10 10 [facility] ...   SILDENAFIL 100MG TAB 02/16/2023 10 10 [facility] ...   SILDENAFIL 100MG TAB 11/22/2022 10 10 [facility] ...         Disclaimer    Certain dispenses may not be available or accurate in this report, including over-the-counter medications, low cost prescriptions,  prescriptions paid for by the patient or non-participating sources, or errors in insurance claims information. The provider should independently verify medication history with the patient.       Richard Hernandez, PharmD  [phone]

## 2023-09-08 LAB
ALANINE AMINOTRANSFERASE (SGPT) (U/L) IN SER/PLAS: 13 U/L (ref 10–52)
ALBUMIN (G/DL) IN SER/PLAS: 4.1 G/DL (ref 3.4–5)
ALBUMIN (MG/L) IN URINE: 525.6 MG/L
ALBUMIN/CREATININE (UG/MG) IN URINE: 1647.6 UG/MG CRT (ref 0–30)
ALKALINE PHOSPHATASE (U/L) IN SER/PLAS: 41 U/L (ref 33–136)
AMORPHOUS CRYSTALS, URINE: ABNORMAL /HPF
ANION GAP IN SER/PLAS: 13 MMOL/L (ref 10–20)
ASPARTATE AMINOTRANSFERASE (SGOT) (U/L) IN SER/PLAS: 15 U/L (ref 9–39)
BACTERIA, URINE: ABNORMAL /HPF
BASOPHILS (10*3/UL) IN BLOOD BY AUTOMATED COUNT: 0.04 X10E9/L (ref 0–0.1)
BASOPHILS/100 LEUKOCYTES IN BLOOD BY AUTOMATED COUNT: 0.5 % (ref 0–2)
BILIRUBIN TOTAL (MG/DL) IN SER/PLAS: 0.4 MG/DL (ref 0–1.2)
CALCIUM (MG/DL) IN SER/PLAS: 9.1 MG/DL (ref 8.6–10.3)
CARBON DIOXIDE, TOTAL (MMOL/L) IN SER/PLAS: 24 MMOL/L (ref 21–32)
CHLORIDE (MMOL/L) IN SER/PLAS: 102 MMOL/L (ref 98–107)
CHOLESTEROL (MG/DL) IN SER/PLAS: 147 MG/DL (ref 0–199)
CHOLESTEROL IN HDL (MG/DL) IN SER/PLAS: 37.7 MG/DL
CHOLESTEROL/HDL RATIO: 3.9
CREATININE (MG/DL) IN SER/PLAS: 1.56 MG/DL (ref 0.5–1.3)
CREATININE (MG/DL) IN URINE: 31.9 MG/DL (ref 20–370)
EOSINOPHILS (10*3/UL) IN BLOOD BY AUTOMATED COUNT: 0.16 X10E9/L (ref 0–0.7)
EOSINOPHILS/100 LEUKOCYTES IN BLOOD BY AUTOMATED COUNT: 2 % (ref 0–6)
ERYTHROCYTE DISTRIBUTION WIDTH (RATIO) BY AUTOMATED COUNT: 12.6 % (ref 11.5–14.5)
ERYTHROCYTE MEAN CORPUSCULAR HEMOGLOBIN CONCENTRATION (G/DL) BY AUTOMATED: 32.4 G/DL (ref 32–36)
ERYTHROCYTE MEAN CORPUSCULAR VOLUME (FL) BY AUTOMATED COUNT: 88 FL (ref 80–100)
ERYTHROCYTES (10*6/UL) IN BLOOD BY AUTOMATED COUNT: 3.85 X10E12/L (ref 4.5–5.9)
ESTIMATED AVERAGE GLUCOSE FOR HBA1C: 160 MG/DL
GFR MALE: 47 ML/MIN/1.73M2
GLUCOSE (MG/DL) IN SER/PLAS: 160 MG/DL (ref 74–99)
HEMATOCRIT (%) IN BLOOD BY AUTOMATED COUNT: 33.9 % (ref 41–52)
HEMOGLOBIN (G/DL) IN BLOOD: 11 G/DL (ref 13.5–17.5)
HEMOGLOBIN A1C/HEMOGLOBIN TOTAL IN BLOOD: 7.2 %
IMMATURE GRANULOCYTES/100 LEUKOCYTES IN BLOOD BY AUTOMATED COUNT: 0.2 % (ref 0–0.9)
LDL: 80 MG/DL (ref 0–99)
LEUKOCYTES (10*3/UL) IN BLOOD BY AUTOMATED COUNT: 8.2 X10E9/L (ref 4.4–11.3)
LYMPHOCYTES (10*3/UL) IN BLOOD BY AUTOMATED COUNT: 2.4 X10E9/L (ref 1.2–4.8)
LYMPHOCYTES/100 LEUKOCYTES IN BLOOD BY AUTOMATED COUNT: 29.4 % (ref 13–44)
MONOCYTES (10*3/UL) IN BLOOD BY AUTOMATED COUNT: 0.51 X10E9/L (ref 0.1–1)
MONOCYTES/100 LEUKOCYTES IN BLOOD BY AUTOMATED COUNT: 6.3 % (ref 2–10)
MUCUS, URINE: ABNORMAL /LPF
NEUTROPHILS (10*3/UL) IN BLOOD BY AUTOMATED COUNT: 5.02 X10E9/L (ref 1.2–7.7)
NEUTROPHILS/100 LEUKOCYTES IN BLOOD BY AUTOMATED COUNT: 61.6 % (ref 40–80)
PLATELETS (10*3/UL) IN BLOOD AUTOMATED COUNT: 286 X10E9/L (ref 150–450)
POTASSIUM (MMOL/L) IN SER/PLAS: 4.6 MMOL/L (ref 3.5–5.3)
PROTEIN TOTAL: 7.2 G/DL (ref 6.4–8.2)
RBC, URINE: 7 /HPF (ref 0–5)
SODIUM (MMOL/L) IN SER/PLAS: 134 MMOL/L (ref 136–145)
THYROTROPIN (MIU/L) IN SER/PLAS BY DETECTION LIMIT <= 0.05 MIU/L: 1.15 MIU/L (ref 0.44–3.98)
TRIGLYCERIDE (MG/DL) IN SER/PLAS: 148 MG/DL (ref 0–149)
UREA NITROGEN (MG/DL) IN SER/PLAS: 31 MG/DL (ref 6–23)
VLDL: 30 MG/DL (ref 0–40)
WBC, URINE: 5 /HPF (ref 0–5)

## 2023-10-02 ENCOUNTER — NUTRITION (OUTPATIENT)
Dept: NUTRITION | Facility: CLINIC | Age: 68
End: 2023-10-02
Payer: MEDICARE

## 2023-10-02 VITALS — BODY MASS INDEX: 31.29 KG/M2 | WEIGHT: 231 LBS | HEIGHT: 72 IN

## 2023-10-02 DIAGNOSIS — E11.319 TYPE 2 DIABETES MELLITUS WITH RETINOPATHY, WITHOUT LONG-TERM CURRENT USE OF INSULIN, MACULAR EDEMA PRESENCE UNSPECIFIED, UNSPECIFIED LATERALITY, UNSPECIFIED RETINOPATHY SEVERITY (MULTI): Primary | ICD-10-CM

## 2023-10-02 DIAGNOSIS — E11.69 TYPE 2 DIABETES MELLITUS WITH OTHER SPECIFIED COMPLICATION, WITHOUT LONG-TERM CURRENT USE OF INSULIN (MULTI): ICD-10-CM

## 2023-10-02 PROCEDURE — 97802 MEDICAL NUTRITION INDIV IN: CPT | Performed by: DIETITIAN, REGISTERED

## 2023-10-02 NOTE — PATIENT INSTRUCTIONS
Utilize the Plate Method at meals in order to balance the types and amounts of food on the plate.   - half of the plate full of non-starchy vegetables. These foods contribute very little carbohydrate to the plate, and they add fiber to the meal. Salad, carrots, bell peppers, zucchini, broccoli, cauliflower, asparagus, radishes, carrots and green beans are a few examples of these foods. They can be served cooked or raw.    - one quarter of plate including protein foods. Examples can include meat, nuts, seeds, cheese, cottage cheese, nut butter, fish, seafood, tofu, and edamame.    - one quarter of the plate including carbohydrate foods. These foods include grains, fruit, legumes, starchy vegetables, milk and yogurt. Aim to eat at least half of the daily grains as whole grains.     2. Also talked with him about how diet and lifestyle can certainly make a substantial impact on his glycemic control, but that medications also have an important role in diabetes management. Congratulated him on the substantial improvement that he's made with his A1C in the last 2 years, but also advised that 7.2% is still a little higher than ideal, and his post-meal high blood sugar levels also indicate that his blood sugars are not fully optimized. Encouraged him to continue to work with his physician(s) on finding the appropriate medication that will optimize glycemic control. Encouraged him to talk with the nephrologist at the upcoming appointment about his concerns about Jardiance and perhaps be more open-minded in the future to taking it or a different diabetes medication.

## 2023-10-02 NOTE — PROGRESS NOTES
Assessment     Reason for Visit:  David Ortiz is a 68 y.o. male who presents for Diabetes and Nutrition Counseling    Anthropometrics:  Anthropometrics  Height: 182.9 cm (6')  Weight: 105 kg (231 lb)  BMI (Calculated): 31.32  Weight Change  Weight History / % Weight Change: weight has been stable around 230# for at least 2 years  Significant Weight Loss: No     Food And Nutrient Intake:  Food and Nutrient History  GI Symptoms: none     Food Intake  Meal 1: 1 slice whole wheat Italian toast yesterday prepared w/ extra egg batter. Other days: oatmeal made with milk, or eggs/paige. He eats breakfast consistently, around 9-10 a.m.  Meal 2: L: fruit / leftovers  Meal 3: D: steak with broccoli last night. He often eats fruit with dinner or in the evening. Carbohydrate intake is variable, sometimes he eats much more carbohydrate, such as 6 ears of corn or multiple cups of watermelon. He eats beef at most dinners, his wife does not like to prepare chicken or fish and he doesn't like to cook. Sometimes he eats ready-to-eat shrimp.    Food Preparation  Cooking: Spouse/Significant Other  Grocery Shopping: Spouse/Significant Other    Food And Nutrient Administration:     Diet Experience  Previous Diet / Nutrition Education / Counseling: Patient said he hadn't seen a doctor in years, but 2 years ago he re-established and he changed his diet when he learned about having diabetes. He worked with dietitian Charline in November 2021 and January 2022.    Knowledge Beliefs Attitudes and Behavior  Food and Nutrition Knowledge  Area(s) and Level of Knowledge/Skill: Patient reports that he cut down on beer and ice cream consumption after his diagnosis of diabetes, but he has a lot of questions today about foods he should eat/avoid. Aside from nutrition knowledge, he has knowledge gaps regarding medications: he believes that metformin causes kidney problems, and he stopped taking Jardiance after believing it was making him feel  "lightheaded/\"woozy.\" His wife also explained that he always reads the side effects about medications and it makes him wary of taking them.    Nutrition Focused Physical Exam:  Subcutaneous Fat Loss  Orbital Fat Pads: Well nourshed (slightly bulging fat pads)  Buccal Fat Pads: Well nourished (full, rounded cheeks)  Triceps: Well nourished (ample fat tissue)  Ribs: Well nourished (full chest, ribs do not protrude)  Muscle Wasting  Temporalis: Well nourished (well-defined muscle)  Pectoralis (Clavicular Region): Well nourished (clavicle not visible)  Deltoid/Trapezius: Well nourished (rounded appearance at arm, shoulder, neck)  Interosseous: Well nourished (muscle bulges)  Trapezius/Infraspinatus/Supraspinatus (Scapular Region): Well nourished (bones not prominent, muscle taut)  Quadriceps: Well nourished (well developed, well rounded)  Gastrocnemius: Well nourished (well developed bulbous muscle)  Physical Findings (Nutrition Deficiency/Toxicity)  Hair: Negative  Eyes: Negative  Nails: Negative  Skin: Negative    Energy Needs  Calculated Energy Needs Using Equations  Height: 182.9 cm (6')  Estimated Energy Needs  Total Energy Estimated Needs (kCal): 2310 kCal  Method for Estimating Needs: 22 kcal/kg  Estimated Protein Needs  Total Protein Estimated Needs (g): 63 g  Method for Estimating Needs: 0.6 gm/kg (limited due to CKD)    Diagnosis   Malnutrition Diagnosis  Patient has Malnutrition Diagnosis: No  Nutrition Diagnosis  Patient has Nutrition Diagnosis: Yes  Diagnosis Status (1): New  Nutrition Diagnosis 1: Inconsistent carbohydrate intake  Related to (1): lack of nutrition knowledge about building a balanced meal, and food preferences  As Evidenced by (1): sometimes patient consumes low carbohydrate meals (steak & broccoli) and other meals he consumes large amounts of carbohydrate (6 ears of corn). He also notices that his BG are high after some meals per CGM.    Interventions/Recommendations   Food and Nutrition " Delivery  Meals & Snacks: Carbohydrate-modified diet, Other, specify:, Modify Composition of Meals/Snacks  Nutrition Education  Nutrition Education Content: Content related nutrition education  Nutrition Counseling  Theoretical Basis/Approach: Nutrition counseling based on health belief model    Patient Instructions   Utilize the Plate Method at meals in order to balance the types and amounts of food on the plate.   - half of the plate full of non-starchy vegetables. These foods contribute very little carbohydrate to the plate, and they add fiber to the meal. Salad, carrots, bell peppers, zucchini, broccoli, cauliflower, asparagus, radishes, carrots and green beans are a few examples of these foods. They can be served cooked or raw.    - one quarter of plate including protein foods. Examples can include meat, nuts, seeds, cheese, cottage cheese, nut butter, fish, seafood, tofu, and edamame.    - one quarter of the plate including carbohydrate foods. These foods include grains, fruit, legumes, starchy vegetables, milk and yogurt. Aim to eat at least half of the daily grains as whole grains.     2. Also talked with him about how diet and lifestyle can certainly make a substantial impact on his glycemic control, but that medications also have an important role in diabetes management. Congratulated him on the substantial improvement that he's made with his A1C in the last 2 years, but also advised that 7.2% is still a little higher than ideal, and his post-meal high blood sugar levels also indicate that his blood sugars are not fully optimized. Encouraged him to continue to work with his physician(s) on finding the appropriate medication that will optimize glycemic control. Encouraged him to talk with the nephrologist at the upcoming appointment about his concerns about Jardiance and perhaps be more open-minded in the future to taking it or a different diabetes medication.     Monitoring and Evaluation   Food/Nutrient  Related History Monitoring  Monitoring and Evaluation Plan: Carbohydrate intake, Amount of food  Amount of Food: Estimated amout of food  Criteria: He will use the Plate Method to balance the amount and type of foods at meals, specifically to help restrict carbohydrate intake to about 1/4 of the plate so that he will have a controlled intake of carbohydrate at meals  Estimated carbohydrate intake: Estimated carbohydrate intake  Criteria: He will restrict carbohydrate intake to about 1/4 of the plate.  Knowledge/Beliefs/Attitudes  Monitoring and Evaluation Plan: Beliefs and attitudes  Criteria: He will learn more about benefits of medications in order to be more willing to take them.  Biochemical Data, Medical Tests and Procedures  Monitoring and Evaluation Plan: Glucose/endocrine profile  Glucose/Endocrine Profile: Hemoglobin A1c (HgbA1c), Other (Comment) ((Other = CGM data could be reviewed))    Time Spent  Prep time on day of patient encounter: 10 minutes  Time spent directly with patient, family or caregiver: 60 minutes  Additional Time Spent on Patient Care Activities: 0 minutes  Documentation Time: 15 minutes  Other Time Spent: 0 minutes  Total: 85 minutes

## 2023-10-03 ENCOUNTER — APPOINTMENT (OUTPATIENT)
Dept: PRIMARY CARE | Facility: CLINIC | Age: 68
End: 2023-10-03
Payer: MEDICARE

## 2023-10-04 ENCOUNTER — APPOINTMENT (OUTPATIENT)
Dept: PRIMARY CARE | Facility: CLINIC | Age: 68
End: 2023-10-04
Payer: MEDICARE

## 2023-10-05 ENCOUNTER — APPOINTMENT (OUTPATIENT)
Dept: PRIMARY CARE | Facility: CLINIC | Age: 68
End: 2023-10-05
Payer: MEDICARE

## 2023-11-27 ENCOUNTER — NUTRITION (OUTPATIENT)
Dept: NUTRITION | Facility: CLINIC | Age: 68
End: 2023-11-27
Payer: MEDICARE

## 2023-11-27 VITALS — HEIGHT: 72 IN | WEIGHT: 231 LBS | BODY MASS INDEX: 31.29 KG/M2

## 2023-11-27 DIAGNOSIS — E11.319 TYPE 2 DIABETES MELLITUS WITH RETINOPATHY, WITHOUT LONG-TERM CURRENT USE OF INSULIN, MACULAR EDEMA PRESENCE UNSPECIFIED, UNSPECIFIED LATERALITY, UNSPECIFIED RETINOPATHY SEVERITY (MULTI): Primary | ICD-10-CM

## 2023-11-27 PROCEDURE — 97803 MED NUTRITION INDIV SUBSEQ: CPT | Performed by: DIETITIAN, REGISTERED

## 2023-11-27 NOTE — PATIENT INSTRUCTIONS
Reinforced education provided last time on the Plate Method. Encouraged continuing to limit carbohydrate consumption. Today, educated patient and his wife on Carbohydrate Counting as a more specific way to limit carbohydrates. Explained the following:   - foods that contribute carbohydrate (fruit, grains, legumes, milk/yogurt, starchy vegetables, sugar added to foods, sugar-sweetened beverages)  - foods that contribute no/minimal carbohydrate (protein, fats, non-starchy vegetables)  - how to use portions of food that are 15 grams of carbohydrate to facilitate counting, gave examples of such portions  - how to read a food label to count carbohydrate  - advised patient to consume 60 grams of carbohydrate per meal (4 choices from the list) and 15-30 grams of carbohydrate per snack (1-2 choices from the list).     2. Talked with him about the significance of his blood glucose numbers. Explained that it is normal to see variation in his blood sugar throughout the day, he isn't expected to have the same number all the time. Even if it goes up and down by 20-30 points, this isn't cause for concern. Told him about some of the factors that can impact his blood sugar level. Explained targets for blood glucose fasting ( mg/dL) and 2-hours post-meal (<180 mg/dL).  Explained that it is ok if blood sugar is higher than 180 mg/dL if it has been shorter than 2 hours after the meal. If he's >180 mg/dL after a meal, he will continue to work on limiting carbohydrate (perhaps even less than 60 grams per meal), or he can make sure the carbohydrate foods are optimally paired with other types of food like meat & vegetables (using the Plate Method), or maybe more medicine is needed.

## 2023-11-27 NOTE — PROGRESS NOTES
Nutrition Assessment     Reason for Visit:  David Ortiz is a 68 y.o. male who presents for Nutrition Counseling and Diabetes    Anthropometrics:  Anthropometrics  Height: 182.9 cm (6')  Weight: 105 kg (231 lb)  BMI (Calculated): 31.32  Weight Change  Weight History / % Weight Change: weight has been stable around 230# for at least 2 years     Food And Nutrient Intake:  Food and Nutrient History  Food Allergy: none  Food Intolerance: none     Food Intake  Meal 1: B: He has been using the plate method at breakfast, including eggs, paige & whole wheat toast.  Meal 2: L: deli meat sandwich and a handful of chips  Meal 3: D: varies from day to day, he has been trying to use the plate method at this meal as well. Pasta especially makes him have elevated postprandial blood sugar levels.  Snacks: Fruit typically, such as 3 clementines or a standard-sized bag of microwave popcorn    Food Preparation  Cooking: Spouse/Significant Other  Grocery Shopping: Spouse/Significant Other    Food And Nutrient Administration:  Diet Experience  Previous Diet / Nutrition Education / Counseling: Patient said he hadn't seen a doctor in years, but 2 years ago he re-established and he changed his diet when he learned about having diabetes. He worked with dietitichristine Olivier in November 2021 and January 2022.    Knowledge Beliefs Attitudes and Behavior  Food and Nutrition Knowledge  Area(s) and Level of Knowledge/Skill: At our last visit he was stating he believes metformin causes kidney problems. Today he reports he takes 500 mg per day, and a few times he's taken an additional 500 mg/day. He has an upcoming PCP appointment in December and he plans to talk with his doctor about the dose he should be taking. He has not been interested in taking Jardiance.    Energy Needs  Calculated Energy Needs Using Equations  Height: 182.9 cm (6')  Weight Used for Equation Calculations: 105 kg (231 lb)  [place]- St. Jeor Equation (Overweight or Obese  Patients): 1846  Estimated Energy Needs  Total Energy Estimated Needs (kCal): 2310 kCal  Method for Estimating Needs: 22 kcal/kg  Estimated Protein Needs  Total Protein Estimated Needs (g): 63 g  Method for Estimating Needs: 0.6 gm/kg (limited due to CKD)    Nutrition Diagnosis   Malnutrition Diagnosis  Patient has Malnutrition Diagnosis: No  Nutrition Diagnosis  Patient has Nutrition Diagnosis: Yes  Diagnosis Status (1): Ongoing  Nutrition Diagnosis 1: Inconsistent carbohydrate intake  Related to (1): lack of nutrition knowledge about building a balanced meal, and food preferences  As Evidenced by (1): sometimes patient consumes low carbohydrate meals (steak & broccoli) and other meals he consumes large amounts of carbohydrate (6 years of corn). He also notices that his BG are high after some meals per CGM.    Nutrition Interventions/Recommendations   Food and Nutrition Delivery  Meals & Snacks: Carbohydrate-modified diet, Modify Composition of Meals/Snacks    Patient Instructions   Reinforced education provided last time on the Plate Method. Encouraged continuing to limit carbohydrate consumption. Today, educated patient and his wife on Carbohydrate Counting as a more specific way to limit carbohydrates. Explained the following:   - foods that contribute carbohydrate (fruit, grains, legumes, milk/yogurt, starchy vegetables, sugar added to foods, sugar-sweetened beverages)  - foods that contribute no/minimal carbohydrate (protein, fats, non-starchy vegetables)  - how to use portions of food that are 15 grams of carbohydrate to facilitate counting, gave examples of such portions  - how to read a food label to count carbohydrate  - advised patient to consume 60 grams of carbohydrate per meal (4 choices from the list) and 15-30 grams of carbohydrate per snack (1-2 choices from the list).     2. Talked with him about the significance of his blood glucose numbers. Explained that it is normal to see variation in his  blood sugar throughout the day, he isn't expected to have the same number all the time. Even if it goes up and down by 20-30 points, this isn't cause for concern. Told him about some of the factors that can impact his blood sugar level. Explained targets for blood glucose fasting ( mg/dL) and 2-hours post-meal (<180 mg/dL).  Explained that it is ok if blood sugar is higher than 180 mg/dL if it has been shorter than 2 hours after the meal. If he's >180 mg/dL after a meal, he will continue to work on limiting carbohydrate (perhaps even less than 60 grams per meal), or he can make sure the carbohydrate foods are optimally paired with other types of food like meat & vegetables (using the Plate Method), or maybe more medicine is needed.      Nutrition Monitoring and Evaluation   Food/Nutrient Related History Monitoring  Monitoring and Evaluation Plan: Carbohydrate intake  Estimated carbohydrate intake: Estimated carbohydrate intake  Criteria: He will continue to work on restricting carbohydrate intake to 1/4 of the plate. More specifically, he will limit carbohydrate intake to 4 choices per meal (60 gm carbohydrate/meal) and to 1-2 choices per snack (15-30 gm/snack)  Biochemical Data, Medical Tests and Procedures  Monitoring and Evaluation Plan: Glucose/endocrine profile  Glucose/Endocrine Profile: Other (Comment)  Criteria: post-meal blood glucose will be measured at 2-hours post-meal, with a target of 180 mg/dL or less.

## 2023-12-01 DIAGNOSIS — E78.5 HYPERLIPIDEMIA, UNSPECIFIED HYPERLIPIDEMIA TYPE: Primary | ICD-10-CM

## 2023-12-01 RX ORDER — ROSUVASTATIN CALCIUM 5 MG/1
5 TABLET, COATED ORAL DAILY
Qty: 90 TABLET | Refills: 0 | Status: SHIPPED | OUTPATIENT
Start: 2023-12-01 | End: 2023-12-19 | Stop reason: SDUPTHER

## 2023-12-01 RX ORDER — ROSUVASTATIN CALCIUM 10 MG/1
10 TABLET, COATED ORAL DAILY
Qty: 90 TABLET | Refills: 0 | Status: SHIPPED | OUTPATIENT
Start: 2023-12-01 | End: 2023-12-01 | Stop reason: WASHOUT

## 2023-12-19 ENCOUNTER — OFFICE VISIT (OUTPATIENT)
Dept: PRIMARY CARE | Facility: CLINIC | Age: 68
End: 2023-12-19
Payer: MEDICARE

## 2023-12-19 VITALS
SYSTOLIC BLOOD PRESSURE: 115 MMHG | BODY MASS INDEX: 30.48 KG/M2 | WEIGHT: 225 LBS | HEIGHT: 72 IN | HEART RATE: 80 BPM | DIASTOLIC BLOOD PRESSURE: 68 MMHG | TEMPERATURE: 97.1 F

## 2023-12-19 DIAGNOSIS — E11.69 TYPE 2 DIABETES MELLITUS WITH OTHER SPECIFIED COMPLICATION, WITHOUT LONG-TERM CURRENT USE OF INSULIN (MULTI): ICD-10-CM

## 2023-12-19 DIAGNOSIS — I10 HTN (HYPERTENSION), BENIGN: ICD-10-CM

## 2023-12-19 DIAGNOSIS — E78.5 HYPERLIPIDEMIA, UNSPECIFIED HYPERLIPIDEMIA TYPE: ICD-10-CM

## 2023-12-19 DIAGNOSIS — R26.81 UNSTEADY: ICD-10-CM

## 2023-12-19 DIAGNOSIS — E08.41 DIABETIC MONONEUROPATHY ASSOCIATED WITH DIABETES MELLITUS DUE TO UNDERLYING CONDITION (MULTI): ICD-10-CM

## 2023-12-19 DIAGNOSIS — I10 PRIMARY HYPERTENSION: ICD-10-CM

## 2023-12-19 DIAGNOSIS — Z76.0 MEDICATION REFILL: ICD-10-CM

## 2023-12-19 DIAGNOSIS — E11.22 TYPE 2 DIABETES MELLITUS WITH CHRONIC KIDNEY DISEASE, WITHOUT LONG-TERM CURRENT USE OF INSULIN, UNSPECIFIED CKD STAGE (MULTI): Primary | ICD-10-CM

## 2023-12-19 LAB — POC HEMOGLOBIN A1C: 6.7 % (ref 4.2–6.5)

## 2023-12-19 PROCEDURE — 3051F HG A1C>EQUAL 7.0%<8.0%: CPT | Performed by: INTERNAL MEDICINE

## 2023-12-19 PROCEDURE — 3074F SYST BP LT 130 MM HG: CPT | Performed by: INTERNAL MEDICINE

## 2023-12-19 PROCEDURE — 1160F RVW MEDS BY RX/DR IN RCRD: CPT | Performed by: INTERNAL MEDICINE

## 2023-12-19 PROCEDURE — 1036F TOBACCO NON-USER: CPT | Performed by: INTERNAL MEDICINE

## 2023-12-19 PROCEDURE — 99214 OFFICE O/P EST MOD 30 MIN: CPT | Performed by: INTERNAL MEDICINE

## 2023-12-19 PROCEDURE — 3078F DIAST BP <80 MM HG: CPT | Performed by: INTERNAL MEDICINE

## 2023-12-19 PROCEDURE — 3008F BODY MASS INDEX DOCD: CPT | Performed by: INTERNAL MEDICINE

## 2023-12-19 PROCEDURE — 1159F MED LIST DOCD IN RCRD: CPT | Performed by: INTERNAL MEDICINE

## 2023-12-19 RX ORDER — HYDROCHLOROTHIAZIDE 50 MG/1
25 TABLET ORAL DAILY
Qty: 90 TABLET | Refills: 0 | Status: SHIPPED | OUTPATIENT
Start: 2023-12-19 | End: 2023-12-20 | Stop reason: SDUPTHER

## 2023-12-19 RX ORDER — ROSUVASTATIN CALCIUM 5 MG/1
5 TABLET, COATED ORAL DAILY
Qty: 90 TABLET | Refills: 1 | Status: SHIPPED | OUTPATIENT
Start: 2023-12-19 | End: 2024-06-16

## 2023-12-19 RX ORDER — AMLODIPINE AND BENAZEPRIL HYDROCHLORIDE 10; 40 MG/1; MG/1
1 CAPSULE ORAL DAILY
Qty: 90 CAPSULE | Refills: 1 | Status: SHIPPED | OUTPATIENT
Start: 2023-12-19 | End: 2024-06-16

## 2023-12-19 RX ORDER — METFORMIN HYDROCHLORIDE 500 MG/1
500 TABLET, EXTENDED RELEASE ORAL
Qty: 90 TABLET | Refills: 1 | Status: SHIPPED | OUTPATIENT
Start: 2023-12-19 | End: 2024-06-16

## 2023-12-19 ASSESSMENT — PATIENT HEALTH QUESTIONNAIRE - PHQ9
1. LITTLE INTEREST OR PLEASURE IN DOING THINGS: NOT AT ALL
2. FEELING DOWN, DEPRESSED OR HOPELESS: NOT AT ALL
SUM OF ALL RESPONSES TO PHQ9 QUESTIONS 1 AND 2: 0

## 2023-12-19 NOTE — PROGRESS NOTES
Subjective   Patient ID: David Ortiz is a 68 y.o. male who presents for Med Refill (Requesting a refill on all his meds sent to [facility]).    HPI Pt is a pleasant 68 y.o. CM who was seen and evaluated during the FU OV. Pt states that he feels good and denies any active health concerns. Pt was seen by the dietician. Pt made  very aggressive changes of his diet. Pt reports long hx of diabetic neuropathy. Pt reports feeling unsteady on his feet and interested in PT sessions to improve his balance. During the clinical encounter pt denies fever, chills, no SOB, no chest pain/tightness, no abdominal pain, no N/V/D reported, no change of urination reported. Pt denies any other health concerns during this visit.  Sohx: reviewed  PMHx and Fhx: reviewed    ROS:  Constitutional: no fever, no chills  Eyes: no eyesight problems, no eye redness, no eye pain, no blurred vision  ENT: no ear pain or sore throat, no nasal discharge or congestion, no sinus pressure, no hearing loss  Cardiovascular: no chest pain or tightness, no SOB, the heart rate was not fast or slow  Respiratory: no cough, no dyspnea with exertion or with rest, no wheezing  Gastrointestinal: no abdominal pain, no constipation or diarrhea, no heartburn, no nausea or vomiting  Genitourinary: no urine frequency, no dysuria, no urinary urgency, no urinary incontinence or retention  Musculoskeletal: no back pain, no arthralgia, no joint swelling or stiffness, no muscle weakness  Integumentary: no skin lesions or rash  Neurological: no headaches, no dizziness or fainting, no limb weakness  Psychiatric: no mood changes, no anxiety or depression, no suicidal thoughts  Endocrine: no weight change, no temperature intolerance, no change of appetite  Hematologic/Lymphatic: no easy bruising or bleeding      Objective   /68 (BP Location: Right arm, Patient Position: Sitting)   Pulse 80   Temp 36.2 °C (97.1 °F)   Ht 1.829 m (6')   Wt 102 kg (225 lb)   BMI 30.52  kg/m²     Physical Exam  Constitutional: well hydrated, well nourished, no acute distress. Vital signs reviewed  Head and face: normocephalic, atraumatic  Eyes: no erythema, edema or visible abnormalities of conjunctiva or lids. Pupils are equal, round and reactive to light. Extraocular movement normal  ENT: external ears without deformities  Neck: full ROM, no adenopathy, neck was supple, thyroid gland not enlarged, no palpable nodules  Pulmonary: normal respiratory rate and effort. Lungs are clear to auscultation, no rales,no rhonchi or wheezing  Cardiovascular: regular rate and rhythm, normal S1 and S2, no murmur, no gallop, posterior tib. pulse normal 2+ bilaterally, no lower extremity edema  Abdomen: soft, non tender on palpation, not distended, normal BS in all 4 quadrants, no CVA tenderness  Musculoskeletal: no joint swelling, normal movements of all 4 extremities. Gait and station: normal, Digits and nails: normal without clubbing  Skin: normal color, no rash  Neurologic: Cranial nerves: CN II: visual acuity intact. Source: acuity reported by patient. Pupils reactive to light with normal accommodation. Right and left pupil normal CN III, IV and VI: the EO movements were intact. CN VIII: no hearing loss. Deep tendon reflexes: were 2+ and symmetric: R and L patella.  Sensory exam: decreased to light to touch from ankles b/l  Psychiatric: Mood and affect: normal, Alert and Oriented x 3  Lymphatic: no cervical lymphadenopathy  Assessment/Plan   HTN: well controlled on the hydrochlorothiazide 25 mg PO daily, Lotrel 10/40 mg PO daily. Will refill  HLD: will refill rosuvastatin 5 mg PO daily  DM T2 with CKD, with neuropathy: HGBA1c POCT  level is 6.7%. Will refill Metformin 500 mg PO daily. Will provide a referral for PT sessions due to the gain issue. Pt has the upcoming OV with the nephrology team  Will check CBC, CMP, Lipid panel and Vitamin B 12 level  Plan was d/c with the pt in details, verbalized  understanding, agreed  Please follow up with PCP as planned or sooner if needed

## 2023-12-20 ENCOUNTER — TELEPHONE (OUTPATIENT)
Dept: PRIMARY CARE | Facility: CLINIC | Age: 68
End: 2023-12-20
Payer: MEDICARE

## 2023-12-20 DIAGNOSIS — I10 PRIMARY HYPERTENSION: ICD-10-CM

## 2023-12-20 RX ORDER — HYDROCHLOROTHIAZIDE 50 MG/1
50 TABLET ORAL DAILY
Qty: 90 TABLET | Refills: 1 | Status: SHIPPED | OUTPATIENT
Start: 2023-12-20 | End: 2023-12-26 | Stop reason: SDUPTHER

## 2023-12-20 NOTE — TELEPHONE ENCOUNTER
Patient said the pills are too small to cut the 50mg hydrochlorothiazide in half.  He is asking if you can re-send as    hydroCHLOROthiazide (HYDRODiuril) 25 mg tablet     [facility] [id number] [id number]  Phone: [phone]  Fax: [phone]

## 2023-12-26 ENCOUNTER — OFFICE VISIT (OUTPATIENT)
Dept: PRIMARY CARE | Facility: CLINIC | Age: 68
End: 2023-12-26
Payer: MEDICARE

## 2023-12-26 ENCOUNTER — TELEPHONE (OUTPATIENT)
Dept: PRIMARY CARE | Facility: CLINIC | Age: 68
End: 2023-12-26

## 2023-12-26 VITALS
TEMPERATURE: 97.3 F | BODY MASS INDEX: 31.1 KG/M2 | WEIGHT: 229.6 LBS | HEART RATE: 67 BPM | SYSTOLIC BLOOD PRESSURE: 168 MMHG | DIASTOLIC BLOOD PRESSURE: 90 MMHG | HEIGHT: 72 IN

## 2023-12-26 DIAGNOSIS — I10 PRIMARY HYPERTENSION: ICD-10-CM

## 2023-12-26 DIAGNOSIS — H65.01 NON-RECURRENT ACUTE SEROUS OTITIS MEDIA OF RIGHT EAR: Primary | ICD-10-CM

## 2023-12-26 DIAGNOSIS — I10 HTN (HYPERTENSION), BENIGN: Primary | ICD-10-CM

## 2023-12-26 DIAGNOSIS — Z76.0 MEDICATION REFILL: ICD-10-CM

## 2023-12-26 DIAGNOSIS — H65.05 RECURRENT ACUTE SEROUS OTITIS MEDIA OF LEFT EAR: ICD-10-CM

## 2023-12-26 DIAGNOSIS — H60.511 ACUTE ACTINIC OTITIS EXTERNA OF RIGHT EAR: ICD-10-CM

## 2023-12-26 DIAGNOSIS — H60.512 ACUTE ACTINIC OTITIS EXTERNA OF LEFT EAR: ICD-10-CM

## 2023-12-26 PROCEDURE — 1160F RVW MEDS BY RX/DR IN RCRD: CPT | Performed by: INTERNAL MEDICINE

## 2023-12-26 PROCEDURE — 3077F SYST BP >= 140 MM HG: CPT | Performed by: INTERNAL MEDICINE

## 2023-12-26 PROCEDURE — 99214 OFFICE O/P EST MOD 30 MIN: CPT | Performed by: INTERNAL MEDICINE

## 2023-12-26 PROCEDURE — 3051F HG A1C>EQUAL 7.0%<8.0%: CPT | Performed by: INTERNAL MEDICINE

## 2023-12-26 PROCEDURE — 3008F BODY MASS INDEX DOCD: CPT | Performed by: INTERNAL MEDICINE

## 2023-12-26 PROCEDURE — 3080F DIAST BP >= 90 MM HG: CPT | Performed by: INTERNAL MEDICINE

## 2023-12-26 PROCEDURE — 1036F TOBACCO NON-USER: CPT | Performed by: INTERNAL MEDICINE

## 2023-12-26 PROCEDURE — 1159F MED LIST DOCD IN RCRD: CPT | Performed by: INTERNAL MEDICINE

## 2023-12-26 RX ORDER — HYDROCHLOROTHIAZIDE 25 MG/1
25 TABLET ORAL DAILY
Qty: 90 TABLET | Refills: 0 | Status: SHIPPED | OUTPATIENT
Start: 2023-12-26 | End: 2024-01-17 | Stop reason: ALTCHOICE

## 2023-12-26 RX ORDER — AMOXICILLIN AND CLAVULANATE POTASSIUM 875; 125 MG/1; MG/1
875 TABLET, FILM COATED ORAL 2 TIMES DAILY
Qty: 20 TABLET | Refills: 0 | Status: SHIPPED | OUTPATIENT
Start: 2023-12-26 | End: 2023-12-26 | Stop reason: SDUPTHER

## 2023-12-26 RX ORDER — CIPROFLOXACIN AND DEXAMETHASONE 3; 1 MG/ML; MG/ML
4 SUSPENSION/ DROPS AURICULAR (OTIC) EVERY 12 HOURS
Qty: 7.5 ML | Refills: 0 | Status: SHIPPED | OUTPATIENT
Start: 2023-12-26 | End: 2024-01-02

## 2023-12-26 RX ORDER — HYDROCHLOROTHIAZIDE 50 MG/1
50 TABLET ORAL DAILY
Qty: 90 TABLET | Refills: 1 | Status: SHIPPED | OUTPATIENT
Start: 2023-12-26 | End: 2023-12-26 | Stop reason: WASHOUT

## 2023-12-26 RX ORDER — AMOXICILLIN AND CLAVULANATE POTASSIUM 875; 125 MG/1; MG/1
875 TABLET, FILM COATED ORAL 2 TIMES DAILY
Qty: 20 TABLET | Refills: 0 | Status: SHIPPED | OUTPATIENT
Start: 2023-12-26 | End: 2024-01-05

## 2023-12-26 RX ORDER — CIPROFLOXACIN AND DEXAMETHASONE 3; 1 MG/ML; MG/ML
4 SUSPENSION/ DROPS AURICULAR (OTIC) EVERY 12 HOURS
Qty: 7.5 ML | Refills: 0 | Status: SHIPPED | OUTPATIENT
Start: 2023-12-26 | End: 2023-12-26 | Stop reason: SDUPTHER

## 2023-12-26 NOTE — PROGRESS NOTES
Subjective   Patient ID: David Ortiz is a 68 y.o. male who presents for Earache (Left ear, outer ear) and Hearing Problem (Left ear ).    HPI Pt is a pleasant 68 y.o. CM who was seen and evaluated during the OV with 2 day jhx of left ear congestion and pain. Pt denies any hx of ear canal trauma/injury. Pt does not use Qtips. Pt states that she blow ed his nose a couple times hardly. During the clinical encounter pt denies fever, chills, no SOB, no chest pain/tightness, no abdominal pain, no N/V/D reported, no change of urination reported. Pt denies any other health concerns during this visit.  Sohx: reviewed  PMHx and Fhx: reviewed    Review of Systems  Constitutional: no fever, no chills  Eyes: no eyesight problems, no eye redness, no eye pain, no blurred vision  ENT: as mentioned at HPI  Cardiovascular: no chest pain or tightness, no SOB, the heart rate was not fast or slow  Respiratory: no cough, no dyspnea with exertion or with rest, no wheezing  Gastrointestinal: no abdominal pain, no constipation or diarrhea, no heartburn, no nausea or vomiting  Genitourinary: no urine frequency, no dysuria, no urinary urgency, no urinary incontinence or retention  Musculoskeletal: no back pain, no arthralgia, no joint swelling or stiffness, no muscle weakness  Integumentary: no skin lesions or rash  Neurological: no headaches, no dizziness or fainting, no limb weakness  Psychiatric: no mood changes, no anxiety or depression, no suicidal thoughts  Endocrine: no weight change, no temperature intolerance, no change of appetite  Hematologic/Lymphatic: no easy bruising or bleeding  Objective   /90 (BP Location: Right arm, Patient Position: Sitting, BP Cuff Size: Large adult)   Pulse 67   Temp 36.3 °C (97.3 °F)   Ht 1.829 m (6')   Wt 104 kg (229 lb 9.6 oz)   BMI 31.14 kg/m²     Physical Exam  Constitutional: well hydrated, well nourished, no acute distress. Vital signs reviewed  Head and face: normocephalic,  "atraumatic  Eyes: no erythema, edema or visible abnormalities of conjunctiva or lids. Pupils are equal, round and reactive to light. Extraocular movement normal  ENT: external ears without deformities, external  left ear canal showed diffuse redness, TM is diffusely erythematous, The otoscopic exam of the R ear WNL  Neck: full ROM, no adenopathy, neck was supple, thyroid gland not enlarged, no palpable nodules  Pulmonary: normal respiratory rate and effort. Lungs are clear to auscultation, no rales,no rhonchi or wheezing  Cardiovascular: regular rate and rhythm, normal S1 and S2, no murmur, no gallop, posterior tib. pulse normal 2+ bilaterally, no lower extremity edema  Abdomen: soft, non tender on palpation, not distended, normal BS in all 4 quadrants, no CVA tenderness  Musculoskeletal: no joint swelling, normal movements of all 4 extremities. Gait and station: normal, Digits and nails: normal without clubbing  Skin: normal color, no rash  Neurologic: Cranial nerves: CN II: visual acuity intact. Source: acuity reported by patient. Pupils reactive to light with normal accommodation. Right and left pupil normal CN III, IV and VI: the EO movements were intact. CN VIII: no hearing loss. Deep tendon reflexes: were 2+ and symmetric: R and L triceps, R and L brachioradialis, R and L patella.  Sensory exam: normal light to touch, pain and temperature  Psychiatric: Mood and affect: normal, Alert and Oriented x 3  Lymphatic: no cervical lymphadenopathy    Assessment/Plan   Acute otitis externa, Acute otitis media:  Hx and PE as mentioned  Will start on Amox/Clav 875/125 mg PO BID. Ciproflox/Dex otic sol BIDx7 days  Pt was instructed to use tylenol for pain management  Pt was instructed to contact PCP if pt will have no improvement of the condition/worsening of the sxs/new sxs on the current treatment    HTN\" pt requested refill on hydrochlorothiazide since the previous one\" did not go though\" The medicine was " refilled.  Plan was d/c with the pt in details, verbalized understanding, agreed  Please follow up with PCP as planned or sooner if needed

## 2023-12-26 NOTE — TELEPHONE ENCOUNTER
Patient called in saying his hydroCHLOROthiazide was ordered as 50mg tablets but should have been 25mg tablets. Is it possible to correct this?

## 2024-01-17 ENCOUNTER — OFFICE VISIT (OUTPATIENT)
Dept: NEPHROLOGY | Facility: CLINIC | Age: 69
End: 2024-01-17
Payer: MEDICARE

## 2024-01-17 VITALS
BODY MASS INDEX: 30.34 KG/M2 | SYSTOLIC BLOOD PRESSURE: 174 MMHG | DIASTOLIC BLOOD PRESSURE: 71 MMHG | HEIGHT: 72 IN | WEIGHT: 224 LBS | HEART RATE: 85 BPM

## 2024-01-17 DIAGNOSIS — N18.31 STAGE 3A CHRONIC KIDNEY DISEASE (MULTI): ICD-10-CM

## 2024-01-17 DIAGNOSIS — I10 ESSENTIAL HYPERTENSION: ICD-10-CM

## 2024-01-17 DIAGNOSIS — E08.22 DIABETES MELLITUS DUE TO UNDERLYING CONDITION WITH DIABETIC CHRONIC KIDNEY DISEASE, UNSPECIFIED CKD STAGE, UNSPECIFIED WHETHER LONG TERM INSULIN USE (MULTI): Primary | ICD-10-CM

## 2024-01-17 DIAGNOSIS — R80.8 OTHER PROTEINURIA: ICD-10-CM

## 2024-01-17 PROCEDURE — 1160F RVW MEDS BY RX/DR IN RCRD: CPT | Performed by: INTERNAL MEDICINE

## 2024-01-17 PROCEDURE — 3077F SYST BP >= 140 MM HG: CPT | Performed by: INTERNAL MEDICINE

## 2024-01-17 PROCEDURE — 1036F TOBACCO NON-USER: CPT | Performed by: INTERNAL MEDICINE

## 2024-01-17 PROCEDURE — 3008F BODY MASS INDEX DOCD: CPT | Performed by: INTERNAL MEDICINE

## 2024-01-17 PROCEDURE — 3066F NEPHROPATHY DOC TX: CPT | Performed by: INTERNAL MEDICINE

## 2024-01-17 PROCEDURE — 3078F DIAST BP <80 MM HG: CPT | Performed by: INTERNAL MEDICINE

## 2024-01-17 PROCEDURE — 1159F MED LIST DOCD IN RCRD: CPT | Performed by: INTERNAL MEDICINE

## 2024-01-17 PROCEDURE — 99204 OFFICE O/P NEW MOD 45 MIN: CPT | Performed by: INTERNAL MEDICINE

## 2024-01-17 RX ORDER — DAPAGLIFLOZIN 10 MG/1
10 TABLET, FILM COATED ORAL DAILY
Qty: 90 TABLET | Refills: 3 | Status: SHIPPED | OUTPATIENT
Start: 2024-01-17 | End: 2024-03-19 | Stop reason: WASHOUT

## 2024-01-17 RX ORDER — CHLORTHALIDONE 25 MG/1
25 TABLET ORAL DAILY
Qty: 90 TABLET | Refills: 3 | Status: SHIPPED | OUTPATIENT
Start: 2024-01-17 | End: 2024-03-19 | Stop reason: WASHOUT

## 2024-01-17 NOTE — PROGRESS NOTES
David Ortiz   68 y.o.      Vitals:    01/17/24 0936   Weight: 102 kg (224 lb)      MRN/Room: [id number]/Room/bed info not found    History Of Present Illness  David Ortiz is a 68 y.o. male presenting with elevated Cr level..     Past Medical History  He has a past medical history of Personal history of other diseases of the circulatory system (04/14/2022) and Personal history of other diseases of the circulatory system (04/14/2022).    Surgical History  He has a past surgical history that includes XR knee (Right, 2004) and Clavicle surgery (Left, 2004).     Social History  He reports that he has never smoked. He has never used smokeless tobacco. He reports current alcohol use. He reports that he does not use drugs.    Family History  Family History   Problem Relation Name Age of Onset    No Known Problems Mother      No Known Problems Father          Allergies  Empagliflozin    Meds:       Current Outpatient Medications   Medication Sig Dispense Refill    amLODIPine-benazepriL (Lotrel) 10-40 mg capsule Take 1 capsule by mouth once daily. 90 capsule 1    FreeStyle Cindy sensor system kit Use as instructed 2 each 0    hydroCHLOROthiazide (HYDRODiuril) 25 mg tablet Take 1 tablet (25 mg) by mouth once daily. 90 tablet 0    metFORMIN  mg 24 hr tablet Take 1 tablet (500 mg) by mouth once daily with breakfast. Do not crush, chew, or split. 90 tablet 1    rosuvastatin (Crestor) 5 mg tablet Take 1 tablet (5 mg) by mouth once daily. 90 tablet 1    sildenafil (Viagra) 100 mg tablet Take 1 tablet (100 mg) by mouth if needed for erectile dysfunction. 10 tablet 2     No current facility-administered medications for this visit.         ROS:  The patient is awake and oriented. No dizziness or lightheadedness. No chills and no fever. No headaches. No nausea and no vomiting. No shortness of breath. No cough. No sputum. No chest pain. No chest tightness. No abdominal pain. No diarrhea and no constipation. No hematemesis  or hemoptysis. No hematuria. No rectal bleeding. No melena. No epistaxis. No urinary symptoms. No flank pain. No leg edema. No leg pain. No weakness. No itching. Overall, the rest of the review of systems is also negative.  12 point review of systems otherwise negative as stated in HPI.        Physical Exam:        Vitals:    01/17/24 0936   BP: (!) 182/78   Pulse: 85     General: The patient is awake, oriented, and is not in any distress.  Head and Neck: Normocephalic. No periorbital edema.  Respiratory: Symmetric air entry. Symmetric chest expansion.No respiratory distress.  Cardiovascular: Symmetric peripheral pulses.  Skin: No maculopapular rash.  Musculoskeletal: No peripheral edema in both left and right upper extremities.  No edema in either left or right lower extremities.  Neuro Exam: Speech is fluent. Moves extremities.        Blood Labs:  No results found for this or any previous visit (from the past 24 hour(s)).   Lab Results   Component Value Date    GLUCOSE 160 (H) 09/08/2023    CALCIUM 9.1 09/08/2023     (L) 09/08/2023    K 4.6 09/08/2023    CO2 24 09/08/2023     09/08/2023    BUN 31 (H) 09/08/2023    CREATININE 1.56 (H) 09/08/2023       Imaging:        Assessment and Plan:  1 chronic kidney disease stage III.  Last creatinine level is 1.56.  Probably secondary to diabetic nephropathy.  Normal potassium and bicarb level.  I asked for PTH, phosphorus, 25-hydroxy vitamin D, microscopic urinalysis, and a kidney ultrasound.    2.  Proteinuria.  He has about 1.4 g albuminuria.  This is probably because of diabetic nephropathy.  He is on maximum dose of benazepril.  I added Farxiga to his medications.  Asked for urine and serum protein electrophoresis and immunofixation and free light chain assay.    3.  Hypertension.  Blood pressure is high.  I replaced his hydrochlorothiazide with chlorthalidone.    4.  Diabetes.    I will see him in about 2 to 3 weeks for follow-up.    Coy Cortez MD

## 2024-03-19 ENCOUNTER — OFFICE VISIT (OUTPATIENT)
Dept: PRIMARY CARE | Facility: CLINIC | Age: 69
End: 2024-03-19
Payer: MEDICARE

## 2024-03-19 VITALS
HEIGHT: 72 IN | HEART RATE: 76 BPM | WEIGHT: 224 LBS | SYSTOLIC BLOOD PRESSURE: 140 MMHG | BODY MASS INDEX: 30.34 KG/M2 | TEMPERATURE: 97 F | DIASTOLIC BLOOD PRESSURE: 85 MMHG

## 2024-03-19 DIAGNOSIS — E78.5 HYPERLIPIDEMIA, UNSPECIFIED HYPERLIPIDEMIA TYPE: ICD-10-CM

## 2024-03-19 DIAGNOSIS — R20.0 NUMBNESS OF FOOT: ICD-10-CM

## 2024-03-19 DIAGNOSIS — N18.31 STAGE 3A CHRONIC KIDNEY DISEASE (MULTI): ICD-10-CM

## 2024-03-19 DIAGNOSIS — E11.40 CONTROLLED TYPE 2 DIABETES WITH NEUROPATHY (MULTI): Primary | ICD-10-CM

## 2024-03-19 DIAGNOSIS — I10 PRIMARY HYPERTENSION: ICD-10-CM

## 2024-03-19 LAB — POC HEMOGLOBIN A1C: 6.7 % (ref 4.2–6.5)

## 2024-03-19 PROCEDURE — 3078F DIAST BP <80 MM HG: CPT | Performed by: INTERNAL MEDICINE

## 2024-03-19 PROCEDURE — 3008F BODY MASS INDEX DOCD: CPT | Performed by: INTERNAL MEDICINE

## 2024-03-19 PROCEDURE — 3077F SYST BP >= 140 MM HG: CPT | Performed by: INTERNAL MEDICINE

## 2024-03-19 PROCEDURE — 83036 HEMOGLOBIN GLYCOSYLATED A1C: CPT | Performed by: INTERNAL MEDICINE

## 2024-03-19 PROCEDURE — 1159F MED LIST DOCD IN RCRD: CPT | Performed by: INTERNAL MEDICINE

## 2024-03-19 PROCEDURE — 99214 OFFICE O/P EST MOD 30 MIN: CPT | Performed by: INTERNAL MEDICINE

## 2024-03-19 PROCEDURE — 1160F RVW MEDS BY RX/DR IN RCRD: CPT | Performed by: INTERNAL MEDICINE

## 2024-03-19 PROCEDURE — 1036F TOBACCO NON-USER: CPT | Performed by: INTERNAL MEDICINE

## 2024-03-19 RX ORDER — UBIDECARENONE 75 MG
500 CAPSULE ORAL DAILY
COMMUNITY

## 2024-03-19 RX ORDER — HYDROCHLOROTHIAZIDE 25 MG/1
25 TABLET ORAL DAILY
COMMUNITY
End: 2024-04-02 | Stop reason: SDUPTHER

## 2024-03-19 ASSESSMENT — PATIENT HEALTH QUESTIONNAIRE - PHQ9
SUM OF ALL RESPONSES TO PHQ9 QUESTIONS 1 AND 2: 0
2. FEELING DOWN, DEPRESSED OR HOPELESS: NOT AT ALL
1. LITTLE INTEREST OR PLEASURE IN DOING THINGS: NOT AT ALL

## 2024-03-19 NOTE — PROGRESS NOTES
Subjective   Patient ID: David Ortiz is a 68 y.o. male who presents for Follow-up and Diabetes (She says he is here to get an A1C done).    HPI Pt is a pleasant 68 y.o. CM who was seen and evaluated during the FU OV. Pt states that overall he feels good. Pt would like to discusses the chronic  feet numbness which he has for the years with the neurology team, other than with podiatry team. Pt states that he did not take the new meds which the nephrology team prescribed. Pt did not have the BW yet and was encourage to have the BW done before the next OV. During the clinical encounter pt denies fever, chills, no SOB, no chest pain/tightness, no abdominal pain, no N/V/D reported, no change of urination reported. Pt denies any other health concerns during this visit.  Sohx: reviewed  PMHx and Fhx: reviewed    Review of Systems  12 Systems have been reviewed as follows:   Constitutional: no fever, no chills  Eyes: no eyesight problems, no eye redness, no eye pain, no blurred vision  ENT: no ear pain or sore throat, no nasal discharge or congestion, no sinus pressure, no hearing loss  Cardiovascular: no chest pain or tightness, no SOB, the heart rate was not fast or slow  Respiratory: no cough, no dyspnea with exertion or with rest, no wheezing  Gastrointestinal: no abdominal pain, no constipation or diarrhea, no heartburn, no nausea or vomiting  Genitourinary: no urine frequency, no dysuria, no urinary urgency, no urinary incontinence or retention  Musculoskeletal: no back pain, no arthralgia, no joint swelling or stiffness, no muscle weakness  Integumentary: no skin lesions or rash  Neurological: no headaches, no dizziness or fainting, no limb weakness, but as mentioned at HPI  Psychiatric: no mood changes, no anxiety or depression, no SI/HI  Endocrine: no weight change, no temperature intolerance, no change of appetite  Hematologic/Lymphatic: no easy bruising or bleeding  Objective   /76 (BP Location: Right  arm, Patient Position: Sitting, BP Cuff Size: Large adult)   Pulse 76   Temp 36.1 °C (97 °F)   Ht 1.829 m (6')   Wt 102 kg (224 lb)   BMI 30.38 kg/m²     Physical Exam  Constitutional: well hydrated, well nourished, no acute distress. Vital signs reviewed  Head and face: normocephalic, atraumatic  Eyes: no erythema, edema or visible abnormalities of conjunctiva or lids. Pupils are equal, round and reactive to light. Extraocular movement normal  ENT: external ears without deformities  Neck: full ROM, no adenopathy, neck was supple, thyroid gland not enlarged, no palpable nodules  Pulmonary: normal respiratory rate and effort. Lungs are clear to auscultation, no rales,no rhonchi or wheezing  Cardiovascular: RRR, normal S1 and S2, no murmur, no gallop, posterior tib. pulse normal 2+ bilaterally, no lower extremity edema  Abdomen: soft, non tender on palpation, not distended, normal BS in all 4 quadrants  Musculoskeletal: no joint swelling, normal movements of all 4 extremities. Gait and station: normal, Digits and nails: normal without clubbing  Skin: normal color, no rash  Neurologic: Cranial nerves: CN II: visual acuity intact. Source: acuity reported by patient. Pupils reactive to light with normal accommodation. Right and left pupil normal CN III, IV and VI: the EO movements were intact. CN VIII: no hearing loss. Deep tendon reflexes: were 2+ and symmetric: R and L patella.  Sensory exam:  light to touch sensation decreased up to lower mid posterior area of the legs b/l  Psychiatric: Mood and affect: normal, Alert and Oriented x 3  Lymphatic: no cervical lymphadenopathy  Assessment/Plan   HTN, controlled suboptimally. The repeated BP readings: 140/85 mm HG. Pt was reluctant to take any new meds. Will continue on the current med regimen with Lotrel 10-40mg po daily and hydrochlorothiazide 25 mg PO daily  DM t2 with peripheral neuropathy: well controlled on Metformin  500 mg PO daily. The POCT HGBA1C reading  today is 6.7%. The referral for the neurology team evaluation was provided as per the pts request  HLD: on rosuvastatin 5 mg PO daily  CKD: another referral for the nephrology team re evaluation was provided  Pt was advised to have the BW done as it was ordered during the previous clinical encounter  I discussed every sxs with the pt in detail. Pt verbalized understanding of the health  condition, possible health risks, adverse events or reactions and even death. Pt agreed with the treatment plan.   Please follow up with PCP in July for the AWV as planned or sooner if needed

## 2024-04-01 ENCOUNTER — TELEPHONE (OUTPATIENT)
Dept: PRIMARY CARE | Facility: CLINIC | Age: 69
End: 2024-04-01
Payer: MEDICARE

## 2024-04-01 NOTE — TELEPHONE ENCOUNTER
Pharmacy is asking for a callback to clarify      hydroCHLOROthiazide (HYDRODiuril)     Please call  [phone]

## 2024-04-02 DIAGNOSIS — I10 PRIMARY HYPERTENSION: Primary | ICD-10-CM

## 2024-04-02 RX ORDER — HYDROCHLOROTHIAZIDE 25 MG/1
25 TABLET ORAL DAILY
Qty: 30 TABLET | Refills: 0 | Status: SHIPPED | OUTPATIENT
Start: 2024-04-02 | End: 2024-04-04 | Stop reason: SDUPTHER

## 2024-04-02 NOTE — TELEPHONE ENCOUNTER
[facility] pharmacist ([facility]) called requesting a rx sent in for hydrochlorothiazide 25mg so the pt does not need to cut the 50mg tablets in half

## 2024-04-04 DIAGNOSIS — I10 PRIMARY HYPERTENSION: ICD-10-CM

## 2024-04-04 RX ORDER — HYDROCHLOROTHIAZIDE 25 MG/1
25 TABLET ORAL DAILY
Qty: 90 TABLET | Refills: 0 | Status: SHIPPED | OUTPATIENT
Start: 2024-04-04 | End: 2024-07-03

## 2024-04-04 NOTE — TELEPHONE ENCOUNTER
Pt called and stated he does not come back until July for an OV, he picked up his  hydroCHLOROthiazide (HYDRODiuril) 25 mg tablet   Pt noticed that the quantity was only 30 he stated he would like a 90 day supply so it will last him until he comes back 7/08

## 2024-04-10 ENCOUNTER — TELEPHONE (OUTPATIENT)
Dept: PRIMARY CARE | Facility: CLINIC | Age: 69
End: 2024-04-10
Payer: MEDICARE

## 2024-04-10 NOTE — TELEPHONE ENCOUNTER
Incoming call from pt, spoke with pt regarding the hydroCHLOROthiazide (HYDRODiuril) 25 mg tablet sent to his pharmacy 04/04/24, pt advised me that a 90 day RX should have been sent over, pt was only given a 30 day supply, advised pt that 90 days was sent over, advised pt to give the pharmacy a call.

## 2024-05-21 DIAGNOSIS — E11.69 TYPE 2 DIABETES MELLITUS WITH OTHER SPECIFIED COMPLICATION, WITHOUT LONG-TERM CURRENT USE OF INSULIN (MULTI): ICD-10-CM

## 2024-05-21 DIAGNOSIS — E11.319 DIABETIC RETINOPATHY ASSOCIATED WITH TYPE 2 DIABETES MELLITUS, MACULAR EDEMA PRESENCE UNSPECIFIED, UNSPECIFIED LATERALITY, UNSPECIFIED RETINOPATHY SEVERITY (MULTI): ICD-10-CM

## 2024-05-21 NOTE — TELEPHONE ENCOUNTER
Rx Refill Request Telephone Encounter    Name:  David Ortiz  :  [id number]  Medication Name:    Freestyle cecilio  refills      Specific Pharmacy location:    [facility] [address] [place] [id number]  Phone: [phone]  Fax: [phone]  CESAR #: [id number]     Date of last appointment:  3/19/24    Date of next appointment:  24

## 2024-05-22 ENCOUNTER — TELEPHONE (OUTPATIENT)
Dept: CARDIOLOGY | Facility: CLINIC | Age: 69
End: 2024-05-22
Payer: MEDICARE

## 2024-05-22 DIAGNOSIS — E11.22 TYPE 2 DIABETES MELLITUS WITH CHRONIC KIDNEY DISEASE, WITHOUT LONG-TERM CURRENT USE OF INSULIN, UNSPECIFIED CKD STAGE (MULTI): Primary | ICD-10-CM

## 2024-05-22 RX ORDER — BLOOD-GLUCOSE SENSOR
1 EACH MISCELLANEOUS
Qty: 12 EACH | Refills: 1 | Status: SHIPPED | OUTPATIENT
Start: 2024-05-22 | End: 2024-05-24 | Stop reason: ALTCHOICE

## 2024-05-22 NOTE — TELEPHONE ENCOUNTER
"Patient called very upset that prescription for Freestyle Cindy sensor kit was only refilled for 2. He stated he specifically always tells this office he needs 6 refills on it and we cannot do our jobs and the DrTay Cannot send it in the correct way. He also said \"what do I have to do come up there and wait until Dr. Chu gives me what I want.  "

## 2024-05-22 NOTE — TELEPHONE ENCOUNTER
Patient came into office to discuss this again. He says that the pharmacy will not fill the prescription because it will mess up his insurance if the script is not for 6 at a time with three refills. He would like a call when the corrected script has been sent in.

## 2024-05-24 DIAGNOSIS — E11.22 TYPE 2 DIABETES MELLITUS WITH CHRONIC KIDNEY DISEASE, WITHOUT LONG-TERM CURRENT USE OF INSULIN, UNSPECIFIED CKD STAGE (MULTI): ICD-10-CM

## 2024-05-24 RX ORDER — FLASH GLUCOSE SENSOR
KIT MISCELLANEOUS AS NEEDED
COMMUNITY
End: 2024-05-25 | Stop reason: SDUPTHER

## 2024-05-24 RX ORDER — BLOOD-GLUCOSE SENSOR
1 EACH MISCELLANEOUS
Qty: 6 EACH | Refills: 3 | Status: CANCELLED | OUTPATIENT
Start: 2024-05-24 | End: 2024-11-20

## 2024-05-24 RX ORDER — FLASH GLUCOSE SENSOR
1 KIT MISCELLANEOUS
Qty: 6 EACH | Refills: 3 | Status: CANCELLED | OUTPATIENT
Start: 2024-05-24

## 2024-05-24 NOTE — TELEPHONE ENCOUNTER
Pt called again and is not happy and wanting a call back ASAP. He is needing a refill on Free style ricky 2, the meds that got filled is wrong. Pt states it is a button that sticks out of his arm and he has a device that read his sugar. He also states his insurance will cover 6 at a time not 12 and has 3 refills. Please call pt when you get a moment. [phone]. Thank  you

## 2024-05-25 DIAGNOSIS — E11.40 CONTROLLED TYPE 2 DIABETES WITH NEUROPATHY (MULTI): Primary | ICD-10-CM

## 2024-05-25 RX ORDER — FLASH GLUCOSE SENSOR
3 KIT MISCELLANEOUS AS NEEDED
Qty: 1 EACH | Refills: 3 | Status: SHIPPED | OUTPATIENT
Start: 2024-05-25 | End: 2024-11-21

## 2024-06-28 ENCOUNTER — TELEPHONE (OUTPATIENT)
Dept: PRIMARY CARE | Facility: CLINIC | Age: 69
End: 2024-06-28
Payer: MEDICARE

## 2024-06-28 DIAGNOSIS — I10 PRIMARY HYPERTENSION: ICD-10-CM

## 2024-06-28 RX ORDER — AMLODIPINE AND BENAZEPRIL HYDROCHLORIDE 10; 40 MG/1; MG/1
1 CAPSULE ORAL DAILY
Qty: 90 CAPSULE | Refills: 1 | Status: SHIPPED | OUTPATIENT
Start: 2024-06-28 | End: 2024-12-25

## 2024-06-28 RX ORDER — HYDROCHLOROTHIAZIDE 25 MG/1
25 TABLET ORAL DAILY
Qty: 90 TABLET | Refills: 0 | Status: SHIPPED | OUTPATIENT
Start: 2024-06-28 | End: 2024-09-26

## 2024-06-28 NOTE — TELEPHONE ENCOUNTER
Rx Refill Request Telephone Encounter    Name:  David Ortiz  :  [id number]  Medication Name:    Patient states he is out of both meds  Requesting 90 day plus refills of     amLODIPine-benazepriL (Lotrel) 10-40 mg       hydroCHLOROthiazide (HYDRODiuril) 25 mg tablet      Specific Pharmacy location:    [facility] [address] [place], [place] [address]  Phone: [phone]  Fax: [phone]     Date of last appointment:  3/19/24    Date of next appointment:  24

## 2024-07-01 ENCOUNTER — LAB (OUTPATIENT)
Dept: LAB | Facility: LAB | Age: 69
End: 2024-07-01
Payer: MEDICARE

## 2024-07-01 DIAGNOSIS — I10 ESSENTIAL HYPERTENSION: ICD-10-CM

## 2024-07-01 DIAGNOSIS — E08.22 DIABETES MELLITUS DUE TO UNDERLYING CONDITION WITH DIABETIC CHRONIC KIDNEY DISEASE, UNSPECIFIED CKD STAGE, UNSPECIFIED WHETHER LONG TERM INSULIN USE (MULTI): ICD-10-CM

## 2024-07-01 DIAGNOSIS — R80.8 OTHER PROTEINURIA: ICD-10-CM

## 2024-07-01 DIAGNOSIS — N18.31 STAGE 3A CHRONIC KIDNEY DISEASE (MULTI): ICD-10-CM

## 2024-07-01 DIAGNOSIS — E55.9 VITAMIN D DEFICIENCY: Primary | ICD-10-CM

## 2024-07-01 DIAGNOSIS — E78.5 HYPERLIPIDEMIA, UNSPECIFIED HYPERLIPIDEMIA TYPE: ICD-10-CM

## 2024-07-01 DIAGNOSIS — E11.22 TYPE 2 DIABETES MELLITUS WITH CHRONIC KIDNEY DISEASE, WITHOUT LONG-TERM CURRENT USE OF INSULIN, UNSPECIFIED CKD STAGE (MULTI): ICD-10-CM

## 2024-07-01 DIAGNOSIS — I10 HTN (HYPERTENSION), BENIGN: ICD-10-CM

## 2024-07-01 LAB
25(OH)D3 SERPL-MCNC: 25 NG/ML (ref 30–100)
ALBUMIN SERPL BCP-MCNC: 4.2 G/DL (ref 3.4–5)
ALP SERPL-CCNC: 43 U/L (ref 33–136)
ALT SERPL W P-5'-P-CCNC: 11 U/L (ref 10–52)
ANION GAP SERPL CALC-SCNC: 11 MMOL/L (ref 10–20)
AST SERPL W P-5'-P-CCNC: 12 U/L (ref 9–39)
BASOPHILS # BLD AUTO: 0.03 X10*3/UL (ref 0–0.1)
BASOPHILS NFR BLD AUTO: 0.3 %
BILIRUB SERPL-MCNC: 0.4 MG/DL (ref 0–1.2)
BUN SERPL-MCNC: 37 MG/DL (ref 6–23)
CALCIUM SERPL-MCNC: 8.6 MG/DL (ref 8.6–10.3)
CHLORIDE SERPL-SCNC: 105 MMOL/L (ref 98–107)
CHOLEST SERPL-MCNC: 111 MG/DL (ref 0–199)
CHOLESTEROL/HDL RATIO: 2.5
CO2 SERPL-SCNC: 25 MMOL/L (ref 21–32)
CREAT SERPL-MCNC: 1.78 MG/DL (ref 0.5–1.3)
CREAT UR-MCNC: 29.5 MG/DL (ref 20–370)
EGFRCR SERPLBLD CKD-EPI 2021: 40 ML/MIN/1.73M*2
EOSINOPHIL # BLD AUTO: 0.2 X10*3/UL (ref 0–0.4)
EOSINOPHIL NFR BLD AUTO: 2.2 %
ERYTHROCYTE [DISTWIDTH] IN BLOOD BY AUTOMATED COUNT: 12.3 % (ref 11.5–14.5)
GLUCOSE SERPL-MCNC: 152 MG/DL (ref 74–99)
HCT VFR BLD AUTO: 32 % (ref 41–52)
HDLC SERPL-MCNC: 44.1 MG/DL
HGB BLD-MCNC: 10.5 G/DL (ref 13.5–17.5)
IMM GRANULOCYTES # BLD AUTO: 0.03 X10*3/UL (ref 0–0.5)
IMM GRANULOCYTES NFR BLD AUTO: 0.3 % (ref 0–0.9)
LDLC SERPL CALC-MCNC: 54 MG/DL
LYMPHOCYTES # BLD AUTO: 2.36 X10*3/UL (ref 0.8–3)
LYMPHOCYTES NFR BLD AUTO: 26.1 %
MCH RBC QN AUTO: 28.5 PG (ref 26–34)
MCHC RBC AUTO-ENTMCNC: 32.8 G/DL (ref 32–36)
MCV RBC AUTO: 87 FL (ref 80–100)
MICROALBUMIN UR-MCNC: 113.7 MG/L
MICROALBUMIN/CREAT UR: 385.4 UG/MG CREAT
MONOCYTES # BLD AUTO: 0.74 X10*3/UL (ref 0.05–0.8)
MONOCYTES NFR BLD AUTO: 8.2 %
NEUTROPHILS # BLD AUTO: 5.67 X10*3/UL (ref 1.6–5.5)
NEUTROPHILS NFR BLD AUTO: 62.9 %
NON HDL CHOLESTEROL: 67 MG/DL (ref 0–149)
NRBC BLD-RTO: 0 /100 WBCS (ref 0–0)
PLATELET # BLD AUTO: 264 X10*3/UL (ref 150–450)
POTASSIUM SERPL-SCNC: 5.2 MMOL/L (ref 3.5–5.3)
PROT SERPL-MCNC: 6.9 G/DL (ref 6.4–8.2)
RBC # BLD AUTO: 3.69 X10*6/UL (ref 4.5–5.9)
RBC #/AREA URNS AUTO: NORMAL /HPF
SODIUM SERPL-SCNC: 136 MMOL/L (ref 136–145)
TRIGL SERPL-MCNC: 67 MG/DL (ref 0–149)
VIT B12 SERPL-MCNC: 417 PG/ML (ref 211–911)
VLDL: 13 MG/DL (ref 0–40)
WBC # BLD AUTO: 9 X10*3/UL (ref 4.4–11.3)
WBC #/AREA URNS AUTO: NORMAL /HPF

## 2024-07-01 PROCEDURE — 83036 HEMOGLOBIN GLYCOSYLATED A1C: CPT

## 2024-07-01 PROCEDURE — 36415 COLL VENOUS BLD VENIPUNCTURE: CPT

## 2024-07-01 PROCEDURE — 82043 UR ALBUMIN QUANTITATIVE: CPT

## 2024-07-01 PROCEDURE — 84165 PROTEIN E-PHORESIS SERUM: CPT

## 2024-07-01 PROCEDURE — 83970 ASSAY OF PARATHORMONE: CPT

## 2024-07-01 PROCEDURE — 84156 ASSAY OF PROTEIN URINE: CPT

## 2024-07-01 PROCEDURE — 80053 COMPREHEN METABOLIC PANEL: CPT

## 2024-07-01 PROCEDURE — 86334 IMMUNOFIX E-PHORESIS SERUM: CPT

## 2024-07-01 PROCEDURE — 80061 LIPID PANEL: CPT

## 2024-07-01 PROCEDURE — 84155 ASSAY OF PROTEIN SERUM: CPT

## 2024-07-01 PROCEDURE — 82306 VITAMIN D 25 HYDROXY: CPT

## 2024-07-01 PROCEDURE — 85025 COMPLETE CBC W/AUTO DIFF WBC: CPT

## 2024-07-01 PROCEDURE — 86335 IMMUNFIX E-PHORSIS/URINE/CSF: CPT

## 2024-07-01 PROCEDURE — 82570 ASSAY OF URINE CREATININE: CPT

## 2024-07-01 PROCEDURE — 83521 IG LIGHT CHAINS FREE EACH: CPT

## 2024-07-01 PROCEDURE — 81001 URINALYSIS AUTO W/SCOPE: CPT

## 2024-07-01 PROCEDURE — 84166 PROTEIN E-PHORESIS/URINE/CSF: CPT

## 2024-07-01 PROCEDURE — 82607 VITAMIN B-12: CPT

## 2024-07-02 ENCOUNTER — TELEPHONE (OUTPATIENT)
Dept: PRIMARY CARE | Facility: CLINIC | Age: 69
End: 2024-07-02
Payer: MEDICARE

## 2024-07-02 DIAGNOSIS — E11.22 TYPE 2 DIABETES MELLITUS WITH CHRONIC KIDNEY DISEASE, WITHOUT LONG-TERM CURRENT USE OF INSULIN, UNSPECIFIED CKD STAGE (MULTI): Primary | ICD-10-CM

## 2024-07-02 LAB
EST. AVERAGE GLUCOSE BLD GHB EST-MCNC: 151 MG/DL
HBA1C MFR BLD: 6.9 %
KAPPA LC SERPL-MCNC: 4.18 MG/DL (ref 0.33–1.94)
KAPPA LC/LAMBDA SER: 1.6 {RATIO} (ref 0.26–1.65)
LAMBDA LC SERPL-MCNC: 2.61 MG/DL (ref 0.57–2.63)
PROT SERPL-MCNC: 7 G/DL (ref 6.4–8.2)
PROT UR-ACNC: 18 MG/DL (ref 5–25)
PTH-INTACT SERPL-MCNC: 126 PG/ML (ref 18.5–88)

## 2024-07-02 RX ORDER — ERGOCALCIFEROL 1.25 MG/1
50000 CAPSULE ORAL
Qty: 6 CAPSULE | Refills: 2 | Status: SHIPPED | OUTPATIENT
Start: 2024-07-02

## 2024-07-02 NOTE — TELEPHONE ENCOUNTER
----- Message from Coy Cortez MD sent at 7/2/2024  3:08 AM EDT -----  High PTH level with Vit D deficiency: I put him on Ergocalciferol.

## 2024-07-02 NOTE — TELEPHONE ENCOUNTER
Pt would like to know if an A1c was suppose to be done with the rest of the BW he had done yesterday, last A1c done was 03/19/24

## 2024-07-04 LAB
ALBUMIN MFR UR ELPH: 77 %
ALBUMIN: 4.1 G/DL (ref 3.4–5)
ALPHA 1 GLOBULIN: 0.3 G/DL (ref 0.2–0.6)
ALPHA 2 GLOBULIN: 0.7 G/DL (ref 0.4–1.1)
ALPHA1 GLOB MFR UR ELPH: 4.7 %
ALPHA2 GLOB MFR UR ELPH: 4.2 %
B-GLOBULIN MFR UR ELPH: 6.8 %
BETA GLOBULIN: 0.8 G/DL (ref 0.5–1.2)
GAMMA GLOB MFR UR ELPH: 7.3 %
GAMMA GLOBULIN: 1.1 G/DL (ref 0.5–1.4)
IMMUNOFIXATION COMMENT: NORMAL
IMMUNOFIXATION COMMENT: NORMAL
PATH REVIEW - SERUM IMMUNOFIXATION: NORMAL
PATH REVIEW - URINE IMMUNOFIXATION: NORMAL
PATH REVIEW-SERUM PROTEIN ELECTROPHORESIS: NORMAL
PATH REVIEW-URINE PROTEIN ELECTROPHORESIS: NORMAL
PROTEIN ELECTROPHORESIS COMMENT: NORMAL
URINE ELECTROPHORESIS COMMENT: NORMAL

## 2024-07-05 NOTE — TELEPHONE ENCOUNTER
----- Message from Fifi Chu MD sent at 7/3/2024 11:28 AM EDT -----  The recent BW results showed well controlled DM. Please schedule the FU OV as planned.

## 2024-07-08 ENCOUNTER — APPOINTMENT (OUTPATIENT)
Dept: PRIMARY CARE | Facility: CLINIC | Age: 69
End: 2024-07-08
Payer: MEDICARE

## 2024-07-11 ENCOUNTER — APPOINTMENT (OUTPATIENT)
Dept: PRIMARY CARE | Facility: CLINIC | Age: 69
End: 2024-07-11
Payer: MEDICARE

## 2024-07-15 ENCOUNTER — APPOINTMENT (OUTPATIENT)
Dept: PRIMARY CARE | Facility: CLINIC | Age: 69
End: 2024-07-15
Payer: MEDICARE

## 2024-07-15 VITALS
SYSTOLIC BLOOD PRESSURE: 160 MMHG | HEART RATE: 75 BPM | HEIGHT: 72 IN | DIASTOLIC BLOOD PRESSURE: 80 MMHG | BODY MASS INDEX: 31.21 KG/M2 | WEIGHT: 230.4 LBS

## 2024-07-15 DIAGNOSIS — E08.41 DIABETIC MONONEUROPATHY ASSOCIATED WITH DIABETES MELLITUS DUE TO UNDERLYING CONDITION (MULTI): ICD-10-CM

## 2024-07-15 DIAGNOSIS — E55.9 VITAMIN D DEFICIENCY: ICD-10-CM

## 2024-07-15 DIAGNOSIS — I10 PRIMARY HYPERTENSION: ICD-10-CM

## 2024-07-15 DIAGNOSIS — Z00.00 ROUTINE GENERAL MEDICAL EXAMINATION AT HEALTH CARE FACILITY: Primary | ICD-10-CM

## 2024-07-15 DIAGNOSIS — E78.5 HYPERLIPIDEMIA, UNSPECIFIED HYPERLIPIDEMIA TYPE: ICD-10-CM

## 2024-07-15 DIAGNOSIS — E11.40 CONTROLLED TYPE 2 DIABETES WITH NEUROPATHY (MULTI): ICD-10-CM

## 2024-07-15 DIAGNOSIS — Z00.00 WELL ADULT HEALTH CHECK: ICD-10-CM

## 2024-07-15 PROBLEM — N18.32 STAGE 3B CHRONIC KIDNEY DISEASE (MULTI): Status: ACTIVE | Noted: 2023-04-20

## 2024-07-15 PROCEDURE — 3060F POS MICROALBUMINURIA REV: CPT | Performed by: INTERNAL MEDICINE

## 2024-07-15 PROCEDURE — 3077F SYST BP >= 140 MM HG: CPT | Performed by: INTERNAL MEDICINE

## 2024-07-15 PROCEDURE — G0439 PPPS, SUBSEQ VISIT: HCPCS | Performed by: INTERNAL MEDICINE

## 2024-07-15 PROCEDURE — 1159F MED LIST DOCD IN RCRD: CPT | Performed by: INTERNAL MEDICINE

## 2024-07-15 PROCEDURE — 1036F TOBACCO NON-USER: CPT | Performed by: INTERNAL MEDICINE

## 2024-07-15 PROCEDURE — 3008F BODY MASS INDEX DOCD: CPT | Performed by: INTERNAL MEDICINE

## 2024-07-15 PROCEDURE — 3044F HG A1C LEVEL LT 7.0%: CPT | Performed by: INTERNAL MEDICINE

## 2024-07-15 PROCEDURE — 3048F LDL-C <100 MG/DL: CPT | Performed by: INTERNAL MEDICINE

## 2024-07-15 PROCEDURE — 1170F FXNL STATUS ASSESSED: CPT | Performed by: INTERNAL MEDICINE

## 2024-07-15 PROCEDURE — 3078F DIAST BP <80 MM HG: CPT | Performed by: INTERNAL MEDICINE

## 2024-07-15 PROCEDURE — 1160F RVW MEDS BY RX/DR IN RCRD: CPT | Performed by: INTERNAL MEDICINE

## 2024-07-15 RX ORDER — ERGOCALCIFEROL 1.25 MG/1
50000 CAPSULE ORAL
Qty: 6 CAPSULE | Refills: 2 | Status: SHIPPED | OUTPATIENT
Start: 2024-07-15

## 2024-07-15 RX ORDER — METOPROLOL SUCCINATE 25 MG/1
25 TABLET, EXTENDED RELEASE ORAL DAILY
Qty: 90 TABLET | Refills: 1 | Status: SHIPPED | OUTPATIENT
Start: 2024-07-15

## 2024-07-15 ASSESSMENT — ACTIVITIES OF DAILY LIVING (ADL)
DOING_HOUSEWORK: INDEPENDENT
BATHING: INDEPENDENT
MANAGING_FINANCES: INDEPENDENT
TAKING_MEDICATION: INDEPENDENT
DRESSING: INDEPENDENT
GROCERY_SHOPPING: INDEPENDENT

## 2024-07-15 ASSESSMENT — PATIENT HEALTH QUESTIONNAIRE - PHQ9
1. LITTLE INTEREST OR PLEASURE IN DOING THINGS: NOT AT ALL
SUM OF ALL RESPONSES TO PHQ9 QUESTIONS 1 AND 2: 0
2. FEELING DOWN, DEPRESSED OR HOPELESS: NOT AT ALL

## 2024-07-15 NOTE — PROGRESS NOTES
Subjective   Reason for Visit: David Ortiz is an 69 y.o. male here for a Medicare Wellness visit.          Reviewed all medications by prescribing practitioner or clinical pharmacist (such as prescriptions, OTCs, herbal therapies and supplements) and documented in the medical record.    HPI  This 69-year-old gentleman who came for an annual wellness visit  He also had a blood work done  He was advised some medication for nephrologist but he apparently has not been following very well the direction  He has neuropathy and has an appointment with neurologist  He had a long time ago colonoscopy examination but none since  He is not planning to really follow the instructions he denied colonoscopy he denied any immunization no pneumonia shot he did not want it he did not want shingles vaccine    Patient Care Team:  Fifi Chu MD as PCP - General (Internal Medicine)  Coy Cortez MD as Nephrologist (Nephrology)     Review of Systems  Neuropathy is a problem  Gait is abnormal  No chest pain no palpitation no shortness of breath  Past medical history reviewed  Social and family history reviewed  Allergies and medications reviewed  Recent labs reviewed  Vital signs reviewed    Objective   Vitals:  /80 (BP Location: Right arm, Patient Position: Sitting)   Pulse 75   Ht 1.829 m (6')   Wt 105 kg (230 lb 6.4 oz)   BMI 31.25 kg/m²       Physical Exam  Heart sounds regular  Chest clear  Abdomen soft nontender  Neuro awake and alert     Results for orders placed or performed in visit on 07/01/24   CBC and Auto Differential   Result Value Ref Range    WBC 9.0 4.4 - 11.3 x10*3/uL    nRBC 0.0 0.0 - 0.0 /100 WBCs    RBC 3.69 (L) 4.50 - 5.90 x10*6/uL    Hemoglobin 10.5 (L) 13.5 - 17.5 g/dL    Hematocrit 32.0 (L) 41.0 - 52.0 %    MCV 87 80 - 100 fL    MCH 28.5 26.0 - 34.0 pg    MCHC 32.8 32.0 - 36.0 g/dL    RDW 12.3 11.5 - 14.5 %    Platelets 264 150 - 450 x10*3/uL    Neutrophils % 62.9 40.0 - 80.0 %     Immature Granulocytes %, Automated 0.3 0.0 - 0.9 %    Lymphocytes % 26.1 13.0 - 44.0 %    Monocytes % 8.2 2.0 - 10.0 %    Eosinophils % 2.2 0.0 - 6.0 %    Basophils % 0.3 0.0 - 2.0 %    Neutrophils Absolute 5.67 (H) 1.60 - 5.50 x10*3/uL    Immature Granulocytes Absolute, Automated 0.03 0.00 - 0.50 x10*3/uL    Lymphocytes Absolute 2.36 0.80 - 3.00 x10*3/uL    Monocytes Absolute 0.74 0.05 - 0.80 x10*3/uL    Eosinophils Absolute 0.20 0.00 - 0.40 x10*3/uL    Basophils Absolute 0.03 0.00 - 0.10 x10*3/uL   Vitamin B12   Result Value Ref Range    Vitamin B12 417 211 - 911 pg/mL   Lipid panel   Result Value Ref Range    Cholesterol 111 0 - 199 mg/dL    HDL-Cholesterol 44.1 mg/dL    Cholesterol/HDL Ratio 2.5     LDL Calculated 54 <=99 mg/dL    VLDL 13 0 - 40 mg/dL    Triglycerides 67 0 - 149 mg/dL    Non HDL Cholesterol 67 0 - 149 mg/dL   Comprehensive Metabolic Panel   Result Value Ref Range    Glucose 152 (H) 74 - 99 mg/dL    Sodium 136 136 - 145 mmol/L    Potassium 5.2 3.5 - 5.3 mmol/L    Chloride 105 98 - 107 mmol/L    Bicarbonate 25 21 - 32 mmol/L    Anion Gap 11 10 - 20 mmol/L    Urea Nitrogen 37 (H) 6 - 23 mg/dL    Creatinine 1.78 (H) 0.50 - 1.30 mg/dL    eGFR 40 (L) >60 mL/min/1.73m*2    Calcium 8.6 8.6 - 10.3 mg/dL    Albumin 4.2 3.4 - 5.0 g/dL    Alkaline Phosphatase 43 33 - 136 U/L    Total Protein 6.9 6.4 - 8.2 g/dL    AST 12 9 - 39 U/L    Bilirubin, Total 0.4 0.0 - 1.2 mg/dL    ALT 11 10 - 52 U/L   Albumin , Urine Random   Result Value Ref Range    Albumin, Urine Random 113.7 Not established mg/L    Creatinine, Urine Random 29.5 20.0 - 370.0 mg/dL    Albumin/Creatinine Ratio 385.4 (H) <30.0 ug/mg Creat   Parathyroid Hormone, Intact   Result Value Ref Range    Parathyroid Hormone, Intact 126.0 (H) 18.5 - 88.0 pg/mL   Vitamin D 25-Hydroxy,Total (for eval of Vitamin D levels)   Result Value Ref Range    Vitamin D, 25-Hydroxy, Total 25 (L) 30 - 100 ng/mL   Microscopic Only, Urine   Result Value Ref Range    WBC,  Urine NONE 1-5, NONE /HPF    RBC, Urine 1-2 NONE, 1-2, 3-5 /HPF   [place]/Lambda Free Light Chain, Serum   Result Value Ref Range    Ig Kappa Free Light Chain 4.18 (H) 0.33 - 1.94 mg/dL    Ig Lambda Free Light Chain 2.61 0.57 - 2.63 mg/dL    Kappa/Lambda Ratio 1.60 0.26 - 1.65   Protein, Urine Random   Result Value Ref Range    Total Protein, Urine Random 18 5 - 25 mg/dL   Urine Protein Electrophoresis + Immunofixation   Result Value Ref Range    Albumin % 77.0 %    Alpha 1 Globulin % 4.7 %    Alpha 2 Globulin % 4.2 %    Beta Globulin % 6.8 %    Gamma Globulin % 7.3 %    Urine Electrophoresis Comment Normal.        Path Review-Urine Protein Electrophoresis        Reviewed and approved by ABBY DE LA GARZA on 7/4/24 at 7:59 AM.        Path Review - Urine Immunofixation       Reviewed and approved by ABBY DE LA GARZA on 7/4/24 at 7:59 AM.        Immunofixation Comment No monoclonal protein detected by immunofixation.    Protein, Total   Result Value Ref Range    Total Protein 7.0 6.4 - 8.2 g/dL   Serum Protein Electrophoresis + Immunofixation   Result Value Ref Range    Albumin 4.1 3.4 - 5.0 g/dL    Alpha 1 Globulin 0.3 0.2 - 0.6 g/dL    Alpha 2 Globulin 0.7 0.4 - 1.1 g/dL    Beta Globulin 0.8 0.5 - 1.2 g/dL    Gamma 1.1 0.5 - 1.4 g/dL    Protein Electrophoresis Comment Normal.     Immunofixation Comment No monoclonal protein detected by immunofixation.     Path Review - Serum Protein Electrophoresis        Reviewed and approved by ABBY DE LA GARZA on 7/4/24 at 8:22 AM.        Path Review - Serum Immunofixation       Reviewed and approved by ABBY DE LA GARZA on 7/4/24 at 8:22 AM.       Hemoglobin A1c   Result Value Ref Range    Hemoglobin A1C 6.9 (H) see below %    Estimated Average Glucose 151 Not Established mg/dL         Assessment/Plan   Problem List Items Addressed This Visit       Controlled type 2 diabetes with neuropathy (Multi)    Overview     Failed pioglitazone- le edema         Relevant Orders    Comprehensive  Metabolic Panel    Hemoglobin A1C    Primary hypertension    Relevant Medications    metoprolol succinate XL (Toprol-XL) 25 mg 24 hr tablet    Hyperlipidemia    Relevant Orders    Lipid Panel    Diabetic mononeuropathy associated with diabetes mellitus due to underlying condition (Multi)    Routine general medical examination at health care facility - Primary     Other Visit Diagnoses       Well adult health check        Relevant Orders    Comprehensive Metabolic Panel          Preventive care and wellness visit discussed he is not very much interested  Control of blood pressure is suboptimal at beta-blocker  Also advised to see Dr. Cortez  For both control of blood pressure as well as for the chronic kidney disease with worsening numbers  Refilled vitamin D as he requested  He is to see neurologist for neuropathy  His diabetes under control and he uses sensor his morning blood sugar was 120  Hyperlipidemia continue current medication  Declined colonoscopy  Declined immunization  Not interested in preventive care  Labs discussed  Labs ordered to be done in 3 months follow-up with the primary care physician

## 2024-07-25 ENCOUNTER — APPOINTMENT (OUTPATIENT)
Dept: NEUROLOGY | Facility: CLINIC | Age: 69
End: 2024-07-25
Payer: MEDICARE

## 2024-08-06 ENCOUNTER — OFFICE VISIT (OUTPATIENT)
Dept: PRIMARY CARE | Facility: CLINIC | Age: 69
End: 2024-08-06
Payer: MEDICARE

## 2024-08-06 VITALS
HEIGHT: 72 IN | WEIGHT: 231.6 LBS | HEART RATE: 76 BPM | SYSTOLIC BLOOD PRESSURE: 170 MMHG | TEMPERATURE: 97.6 F | DIASTOLIC BLOOD PRESSURE: 88 MMHG | BODY MASS INDEX: 31.37 KG/M2

## 2024-08-06 DIAGNOSIS — M70.22 OLECRANON BURSITIS OF LEFT ELBOW: ICD-10-CM

## 2024-08-06 DIAGNOSIS — E11.40 CONTROLLED TYPE 2 DIABETES WITH NEUROPATHY (MULTI): Primary | ICD-10-CM

## 2024-08-06 DIAGNOSIS — E78.5 HYPERLIPIDEMIA, UNSPECIFIED HYPERLIPIDEMIA TYPE: ICD-10-CM

## 2024-08-06 DIAGNOSIS — E66.09 CLASS 1 OBESITY DUE TO EXCESS CALORIES WITH SERIOUS COMORBIDITY AND BODY MASS INDEX (BMI) OF 31.0 TO 31.9 IN ADULT: ICD-10-CM

## 2024-08-06 DIAGNOSIS — E08.41 DIABETIC MONONEUROPATHY ASSOCIATED WITH DIABETES MELLITUS DUE TO UNDERLYING CONDITION (MULTI): ICD-10-CM

## 2024-08-06 DIAGNOSIS — R01.1 HEART MURMUR: ICD-10-CM

## 2024-08-06 DIAGNOSIS — E11.3592 TYPE 2 DIABETES MELLITUS WITH LEFT EYE AFFECTED BY PROLIFERATIVE RETINOPATHY WITHOUT MACULAR EDEMA, WITHOUT LONG-TERM CURRENT USE OF INSULIN (MULTI): ICD-10-CM

## 2024-08-06 DIAGNOSIS — R94.31 ABNORMAL EKG: ICD-10-CM

## 2024-08-06 DIAGNOSIS — N18.32 STAGE 3B CHRONIC KIDNEY DISEASE (MULTI): ICD-10-CM

## 2024-08-06 DIAGNOSIS — I10 PRIMARY HYPERTENSION: ICD-10-CM

## 2024-08-06 PROBLEM — E66.811 CLASS 1 OBESITY DUE TO EXCESS CALORIES WITH SERIOUS COMORBIDITY AND BODY MASS INDEX (BMI) OF 31.0 TO 31.9 IN ADULT: Status: ACTIVE | Noted: 2024-08-06

## 2024-08-06 PROCEDURE — 99214 OFFICE O/P EST MOD 30 MIN: CPT | Performed by: FAMILY MEDICINE

## 2024-08-06 PROCEDURE — 3077F SYST BP >= 140 MM HG: CPT | Performed by: FAMILY MEDICINE

## 2024-08-06 PROCEDURE — 3044F HG A1C LEVEL LT 7.0%: CPT | Performed by: FAMILY MEDICINE

## 2024-08-06 PROCEDURE — 1159F MED LIST DOCD IN RCRD: CPT | Performed by: FAMILY MEDICINE

## 2024-08-06 PROCEDURE — 3079F DIAST BP 80-89 MM HG: CPT | Performed by: FAMILY MEDICINE

## 2024-08-06 PROCEDURE — 3060F POS MICROALBUMINURIA REV: CPT | Performed by: FAMILY MEDICINE

## 2024-08-06 PROCEDURE — 1160F RVW MEDS BY RX/DR IN RCRD: CPT | Performed by: FAMILY MEDICINE

## 2024-08-06 PROCEDURE — 93000 ELECTROCARDIOGRAM COMPLETE: CPT | Performed by: FAMILY MEDICINE

## 2024-08-06 PROCEDURE — 3048F LDL-C <100 MG/DL: CPT | Performed by: FAMILY MEDICINE

## 2024-08-06 PROCEDURE — 3008F BODY MASS INDEX DOCD: CPT | Performed by: FAMILY MEDICINE

## 2024-08-06 ASSESSMENT — PATIENT HEALTH QUESTIONNAIRE - PHQ9
2. FEELING DOWN, DEPRESSED OR HOPELESS: NOT AT ALL
SUM OF ALL RESPONSES TO PHQ9 QUESTIONS 1 AND 2: 0
1. LITTLE INTEREST OR PLEASURE IN DOING THINGS: NOT AT ALL

## 2024-08-06 NOTE — PROGRESS NOTES
"Subjective   Patient ID: David Ortiz is a 69 y.o. male who presents for Medication Problem (Patient states has \"bumps\" on bilateral arms. States stopped all of the medications).  Patient presents today with concerns about swelling at his left elbow.  He states it has been there for a few weeks.  He is concerned that this may be related to his new medication metoprolol and therefore stopped all of his medications.  He states that he has not noticed any pain or difficulty moving the arm.  He denies any injury.  He states that he feels well otherwise.  His blood pressure is very high in the office.  He states that he has been checking it at home and it usually runs 140s and 150s however it was higher today since he stopped his medications.  He had been on amlodipine/benazepril and hydrochlorothiazide for a while without difficulty.  He was recently started on the metoprolol and felt that that may be the cause of the swelling of his elbow.  He states that the right elbow also was swollen at 1 point but no longer is.  He denies any other joint pain.  He does complain of numbness and tingling in his feet which is not new.  He did have an appoint with neurology to evaluate this further but missed it.  He denies any chest pain or shortness of breath.  Denies any abdominal pain.  Denies any headaches or dizziness or visual changes.  He states that he did have issues with his eyes but saw an ophthalmologist and retinal specialist and had laser treatments and they have been significantly improved.  He denies any other concerns.  HPI  Social History     Socioeconomic History    Marital status:      Spouse name: Not on file    Number of children: Not on file    Years of education: Not on file    Highest education level: Not on file   Occupational History    Not on file   Tobacco Use    Smoking status: Never    Smokeless tobacco: Never   Vaping Use    Vaping status: Never Used   Substance and Sexual Activity    " Alcohol use: Yes     Comment: a few servings 1-2 times a week    Drug use: Never    Sexual activity: Not on file   Other Topics Concern    Not on file   Social History Narrative    Not on file     Social Determinants of Health     Financial Resource Strain: Not on file   Food Insecurity: Not on file   Transportation Needs: Not on file   Physical Activity: Not on file   Stress: Not on file   Social Connections: Not on file   Intimate Partner Violence: Not on file   Housing Stability: Not on file     Current Outpatient Medications   Medication Sig Dispense Refill    cyanocobalamin (Vitamin B-12) 500 mcg tablet Take 1 tablet (500 mcg) by mouth once daily.      [employer] Cindy 2 Sensor kit Inject 3 each under the skin if needed (BG). Use as instructed 1 each 3    amLODIPine-benazepriL (Lotrel) 10-40 mg capsule Take 1 capsule by mouth once daily. 90 capsule 1    ergocalciferol (Vitamin D-2) 1.25 MG (86999 UT) capsule Take 1 capsule (50,000 Units) by mouth every 14 (fourteen) days. 6 capsule 2    flash glucose sensor kit kit Use as instructed 2 each 0    hydroCHLOROthiazide (HYDRODiuril) 25 mg tablet Take 1 tablet (25 mg) by mouth once daily. 90 tablet 0    rosuvastatin (Crestor) 5 mg tablet Take 1 tablet (5 mg) by mouth once daily. 90 tablet 1    sildenafil (Viagra) 100 mg tablet Take 1 tablet (100 mg) by mouth if needed for erectile dysfunction. 10 tablet 2     No current facility-administered medications for this visit.     Family History   Problem Relation Name Age of Onset    Heart attack Mother      No Known Problems Father       Review of Systems  Immunization History   Administered Date(s) Administered    Influenza Whole 12/20/2006    Influenza, Unspecified 11/20/2002    Td vaccine, age 7 years and older (TDVAX) 01/01/2000       Review of Systems negative except as noted in HPI and Chief complaint.     Objective                 /88   Pulse 76   Temp 36.4 °C (97.6 °F)   Ht 1.829 m (6')   Wt 105 kg (231 lb  9.6 oz)   BMI 31.41 kg/m²    Physical Exam  Vitals reviewed.   HENT:      Head: Normocephalic and atraumatic.      Right Ear: Tympanic membrane normal.      Left Ear: Tympanic membrane normal.      Nose: Nose normal.      Mouth/Throat:      Pharynx: Oropharynx is clear.   Eyes:      Extraocular Movements: Extraocular movements intact.      Conjunctiva/sclera: Conjunctivae normal.      Pupils: Pupils are equal, round, and reactive to light.   Cardiovascular:      Rate and Rhythm: Normal rate and regular rhythm.      Pulses: Normal pulses.      Heart sounds: Murmur heard.      Systolic murmur is present.   Pulmonary:      Effort: Pulmonary effort is normal.      Breath sounds: Normal breath sounds.   Abdominal:      General: There is no distension.      Palpations: Abdomen is soft.      Tenderness: There is no abdominal tenderness.   Musculoskeletal:         General: Normal range of motion.      Right elbow: Normal.      Left elbow: Effusion present. Normal range of motion. No tenderness.      Cervical back: Normal range of motion and neck supple.      Right lower leg: No edema.      Left lower leg: No edema.   Lymphadenopathy:      Cervical: No cervical adenopathy.   Neurological:      General: No focal deficit present.      Mental Status: He is alert.       No results found for this or any previous visit (from the past 96 hour(s)).    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  Continue with diabetic diet and exercise.  Continue follow-up with ophthalmology.  Check feet.         Primary hypertension     Blood pressure extremely elevated today because patient stopped his medications.  He is concerned that the metoprolol may have caused the olecranon bursitis.  I told him I thought that was  unlikely but he is agreeable to restarting his Lotrel and diuretic and following up in a few weeks to recheck his blood pressure.         Relevant Orders    ECG 12 lead (Clinic Performed)    Stage 3b chronic kidney disease (Multi)     Continue follow-up with nephrology.         Hyperlipidemia     Recommend patient resume his rosuvastatin.         Diabetic mononeuropathy associated with diabetes mellitus due to underlying condition (Multi)     Patient missed his appointment with neurology and we will look at getting him rescheduled.  Diabetes control is good.         Class 1 obesity due to excess calories with serious comorbidity and body mass index (BMI) of 31.0 to 31.9 in adult    Olecranon bursitis of left elbow     No signs of infection.  Patient declines blood work.  He will be scheduled with orthopedics.         Relevant Orders    Referral to Orthopaedic Surgery    Abnormal EKG     EKG done today shows first-degree block and a possible old inferior infarct.  That is unchanged from previous EKG done 3 years ago.  Will plan to check echo and coronary artery calcium score.  Patient declined stress test right now.  Patient aware if patient develop any chest pain or shortness of breath that he should go to the ER.         Relevant Orders    Transthoracic Echo (TTE) Complete    CT cardiac scoring wo IV contrast    Heart murmur     Heart murmur noted on exam.  Patient denies chest pain or shortness of breath.  Check echo to further evaluate.  Will also check coronary artery calcium score.

## 2024-08-07 ENCOUNTER — APPOINTMENT (OUTPATIENT)
Dept: PRIMARY CARE | Facility: CLINIC | Age: 69
End: 2024-08-07
Payer: MEDICARE

## 2024-08-07 ENCOUNTER — APPOINTMENT (OUTPATIENT)
Dept: ORTHOPEDIC SURGERY | Facility: CLINIC | Age: 69
End: 2024-08-07
Payer: MEDICARE

## 2024-08-07 PROBLEM — R26.89 BALANCE DISORDER: Status: ACTIVE | Noted: 2024-08-07

## 2024-08-07 NOTE — ASSESSMENT & PLAN NOTE
Blood pressure extremely elevated today because patient stopped his medications.  He is concerned that the metoprolol may have caused the olecranon bursitis.  I told him I thought that was unlikely but he is agreeable to restarting his Lotrel and diuretic and following up in a few weeks to recheck his blood pressure.

## 2024-08-07 NOTE — ASSESSMENT & PLAN NOTE
Patient missed his appointment with neurology and we will look at getting him rescheduled.  Diabetes control is good.

## 2024-08-07 NOTE — ASSESSMENT & PLAN NOTE
EKG done today shows first-degree block and a possible old inferior infarct.  That is unchanged from previous EKG done 3 years ago.  Will plan to check echo and coronary artery calcium score.  Patient declined stress test right now.  Patient aware if patient develop any chest pain or shortness of breath that he should go to the ER.

## 2024-08-07 NOTE — ASSESSMENT & PLAN NOTE
Heart murmur noted on exam.  Patient denies chest pain or shortness of breath.  Check echo to further evaluate.  Will also check coronary artery calcium score.

## 2024-08-07 NOTE — ASSESSMENT & PLAN NOTE
Diabetes currently controlled with diet.  Continue with diabetic diet and exercise.  Continue follow-up with ophthalmology.  Check feet.

## 2024-08-08 ENCOUNTER — APPOINTMENT (OUTPATIENT)
Dept: NEUROLOGY | Facility: CLINIC | Age: 69
End: 2024-08-08
Payer: MEDICARE

## 2024-08-13 ENCOUNTER — TELEPHONE (OUTPATIENT)
Dept: PRIMARY CARE | Facility: CLINIC | Age: 69
End: 2024-08-13
Payer: MEDICARE
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
## 2024-08-19 ENCOUNTER — OFFICE VISIT (OUTPATIENT)
Dept: ORTHOPEDIC SURGERY | Facility: CLINIC | Age: 69
End: 2024-08-19
Payer: MEDICARE

## 2024-08-19 ENCOUNTER — APPOINTMENT (OUTPATIENT)
Dept: PRIMARY CARE | Facility: CLINIC | Age: 69
End: 2024-08-19
Payer: MEDICARE

## 2024-08-19 ENCOUNTER — APPOINTMENT (OUTPATIENT)
Dept: ORTHOPEDIC SURGERY | Facility: CLINIC | Age: 69
End: 2024-08-19
Payer: MEDICARE

## 2024-08-19 ENCOUNTER — TELEPHONE (OUTPATIENT)
Dept: PRIMARY CARE | Facility: CLINIC | Age: 69
End: 2024-08-19

## 2024-08-19 ENCOUNTER — HOSPITAL ENCOUNTER (OUTPATIENT)
Dept: RADIOLOGY | Facility: CLINIC | Age: 69
Discharge: HOME | End: 2024-08-19
Payer: MEDICARE

## 2024-08-19 VITALS
DIASTOLIC BLOOD PRESSURE: 60 MMHG | SYSTOLIC BLOOD PRESSURE: 138 MMHG | HEART RATE: 77 BPM | OXYGEN SATURATION: 99 % | HEIGHT: 72 IN | BODY MASS INDEX: 30.75 KG/M2 | WEIGHT: 227 LBS

## 2024-08-19 DIAGNOSIS — N18.32 STAGE 3B CHRONIC KIDNEY DISEASE (MULTI): Primary | ICD-10-CM

## 2024-08-19 DIAGNOSIS — M70.22 OLECRANON BURSITIS OF LEFT ELBOW: ICD-10-CM

## 2024-08-19 DIAGNOSIS — M70.22 OLECRANON BURSITIS OF LEFT ELBOW: Primary | ICD-10-CM

## 2024-08-19 DIAGNOSIS — E78.5 HYPERLIPIDEMIA, UNSPECIFIED HYPERLIPIDEMIA TYPE: ICD-10-CM

## 2024-08-19 DIAGNOSIS — I10 PRIMARY HYPERTENSION: ICD-10-CM

## 2024-08-19 DIAGNOSIS — E11.9 TYPE 2 DIABETES MELLITUS WITHOUT COMPLICATION, WITHOUT LONG-TERM CURRENT USE OF INSULIN (MULTI): ICD-10-CM

## 2024-08-19 DIAGNOSIS — Z76.89 ENCOUNTER TO ESTABLISH CARE: ICD-10-CM

## 2024-08-19 DIAGNOSIS — R01.1 HEART MURMUR: ICD-10-CM

## 2024-08-19 PROCEDURE — 73080 X-RAY EXAM OF ELBOW: CPT | Mod: LT

## 2024-08-19 PROCEDURE — 99203 OFFICE O/P NEW LOW 30 MIN: CPT | Performed by: ORTHOPAEDIC SURGERY

## 2024-08-19 PROCEDURE — 3008F BODY MASS INDEX DOCD: CPT | Performed by: NURSE PRACTITIONER

## 2024-08-19 PROCEDURE — 3060F POS MICROALBUMINURIA REV: CPT | Performed by: ORTHOPAEDIC SURGERY

## 2024-08-19 PROCEDURE — 1159F MED LIST DOCD IN RCRD: CPT | Performed by: ORTHOPAEDIC SURGERY

## 2024-08-19 PROCEDURE — 3075F SYST BP GE 130 - 139MM HG: CPT | Performed by: NURSE PRACTITIONER

## 2024-08-19 PROCEDURE — 3048F LDL-C <100 MG/DL: CPT | Performed by: ORTHOPAEDIC SURGERY

## 2024-08-19 PROCEDURE — 99214 OFFICE O/P EST MOD 30 MIN: CPT | Performed by: NURSE PRACTITIONER

## 2024-08-19 PROCEDURE — 3048F LDL-C <100 MG/DL: CPT | Performed by: NURSE PRACTITIONER

## 2024-08-19 PROCEDURE — 1036F TOBACCO NON-USER: CPT | Performed by: ORTHOPAEDIC SURGERY

## 2024-08-19 PROCEDURE — 99214 OFFICE O/P EST MOD 30 MIN: CPT | Performed by: ORTHOPAEDIC SURGERY

## 2024-08-19 PROCEDURE — 3044F HG A1C LEVEL LT 7.0%: CPT | Performed by: NURSE PRACTITIONER

## 2024-08-19 PROCEDURE — 3078F DIAST BP <80 MM HG: CPT | Performed by: NURSE PRACTITIONER

## 2024-08-19 PROCEDURE — 3044F HG A1C LEVEL LT 7.0%: CPT | Performed by: ORTHOPAEDIC SURGERY

## 2024-08-19 PROCEDURE — 73080 X-RAY EXAM OF ELBOW: CPT | Mod: LEFT SIDE | Performed by: RADIOLOGY

## 2024-08-19 PROCEDURE — 1159F MED LIST DOCD IN RCRD: CPT | Performed by: NURSE PRACTITIONER

## 2024-08-19 PROCEDURE — 3060F POS MICROALBUMINURIA REV: CPT | Performed by: NURSE PRACTITIONER

## 2024-08-19 RX ORDER — DAPAGLIFLOZIN 10 MG/1
5 TABLET, FILM COATED ORAL DAILY
Qty: 30 TABLET | Refills: 2 | Status: SHIPPED | OUTPATIENT
Start: 2024-08-19 | End: 2025-08-19

## 2024-08-19 RX ORDER — DAPAGLIFLOZIN 5 MG/1
5 TABLET, FILM COATED ORAL DAILY
Qty: 30 TABLET | Refills: 5 | Status: SHIPPED | OUTPATIENT
Start: 2024-08-19

## 2024-08-19 ASSESSMENT — PATIENT HEALTH QUESTIONNAIRE - PHQ9
10. IF YOU CHECKED OFF ANY PROBLEMS, HOW DIFFICULT HAVE THESE PROBLEMS MADE IT FOR YOU TO DO YOUR WORK, TAKE CARE OF THINGS AT HOME, OR GET ALONG WITH OTHER PEOPLE: SOMEWHAT DIFFICULT
1. LITTLE INTEREST OR PLEASURE IN DOING THINGS: NOT AT ALL
2. FEELING DOWN, DEPRESSED OR HOPELESS: SEVERAL DAYS
SUM OF ALL RESPONSES TO PHQ9 QUESTIONS 1 & 2: 1
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
## 2024-08-19 NOTE — TELEPHONE ENCOUNTER
Pharmacy called and said Farxiga tablets are not scored and not easy to split. Can you just change it to the 5 mg tabs once daily ?

## 2024-08-19 NOTE — PROGRESS NOTES
History present illness: Patient presents today for evaluation of left elbow swelling.  He has no pain.  He denies symptoms compatible with infection.  This happen once to the right elbow and it spontaneously resolved.  History of diabetes.  Last A1c 6.7.      Past medical history: The patient's past medical history, family history, social history, and review of systems were documented on the patient medical intake.  The updated data was reviewed in the electronic medical record.  History is negative except otherwise stated in history of present illness.        Physical examination:  General: Alert and oriented to person, place, and time.  No acute distress and breathing comfortably: Pleasant and cooperative with examination.  HEENT: Head is normocephalic and atraumatic.  Neck: Supple, no visible swelling.  Cardiovascular: No palpable tachycardia  Lungs: No audible wheezing or labored breathing  Abdomen: Nondistended.  Extremities: Evaluation of the left upper extremity finds the patient had palpable radial artery at the wrist with brisk capillary refill to all digits.  Patient has intact sensation to axillary radial median and ulnar nerves.  There are no open wounds.  There are no signs of infection.  There is no evidence of lymphedema or lymphatic streaking.  The patient has supple compartments to left arm forearm and hand.  Large left elbow olecranon bursitis.  No erythema or drainage.      Radiology: X-rays of the left elbow show no acute fracture or dislocation.  Soft tissue prominence noted about olecranon      Assessment: Left elbow olecranon bursitis      Plan: Treatment options were discussed.  Patient was given an Ace wrap for compression and elects for simple observation.  He understands he has the option of surgical resection and/or aspiration injection.  We are going to follow-up in 8 weeks.  If it still symptomatic at that time we may consider aspiration injection.        Procedure:

## 2024-08-26 ENCOUNTER — HOSPITAL ENCOUNTER (OUTPATIENT)
Dept: CARDIOLOGY | Facility: CLINIC | Age: 69
Discharge: HOME | End: 2024-08-26
Payer: MEDICARE

## 2024-08-26 VITALS
DIASTOLIC BLOOD PRESSURE: 74 MMHG | BODY MASS INDEX: 30.75 KG/M2 | HEIGHT: 72 IN | WEIGHT: 227 LBS | SYSTOLIC BLOOD PRESSURE: 140 MMHG

## 2024-08-26 DIAGNOSIS — R94.31 ABNORMAL EKG: ICD-10-CM

## 2024-08-26 PROCEDURE — 93306 TTE W/DOPPLER COMPLETE: CPT | Performed by: INTERNAL MEDICINE

## 2024-08-26 PROCEDURE — 93306 TTE W/DOPPLER COMPLETE: CPT

## 2024-08-27 PROBLEM — D64.9 ANEMIA: Status: ACTIVE | Noted: 2024-08-27

## 2024-08-27 LAB
AORTIC VALVE MEAN GRADIENT: 4.9 MMHG
AORTIC VALVE PEAK VELOCITY: 1.39 M/S
AV PEAK GRADIENT: 7.8 MMHG
AVA (PEAK VEL): 2.38 CM2
AVA (VTI): 2.29 CM2
EJECTION FRACTION APICAL 4 CHAMBER: 68.6
EJECTION FRACTION: 69 %
LEFT ATRIUM VOLUME AREA LENGTH INDEX BSA: 23.3 ML/M2
LEFT VENTRICLE INTERNAL DIMENSION DIASTOLE: 5.5 CM (ref 3.5–6)
LEFT VENTRICULAR OUTFLOW TRACT DIAMETER: 2.14 CM
LV EJECTION FRACTION BIPLANE: 69 %
MITRAL VALVE E/A RATIO: 0.59
RIGHT VENTRICLE PEAK SYSTOLIC PRESSURE: 11.5 MMHG

## 2024-08-28 ENCOUNTER — TELEPHONE (OUTPATIENT)
Dept: PRIMARY CARE | Facility: CLINIC | Age: 69
End: 2024-08-28
Payer: MEDICARE

## 2024-08-28 PROBLEM — Z76.89 ENCOUNTER TO ESTABLISH CARE: Status: ACTIVE | Noted: 2024-08-28

## 2024-08-28 NOTE — TELEPHONE ENCOUNTER
----- Message from Nena Jacobson sent at 8/28/2024  8:51 AM EDT -----  Please advise patient that echo shows normal heart function.  There is mild enlargement of the heart muscle which can be seen with high blood pressure.  Echo is otherwise normal.

## 2024-09-03 ENCOUNTER — APPOINTMENT (OUTPATIENT)
Dept: PRIMARY CARE | Facility: CLINIC | Age: 69
End: 2024-09-03
Payer: MEDICARE

## 2024-09-03 VITALS
HEART RATE: 76 BPM | DIASTOLIC BLOOD PRESSURE: 71 MMHG | TEMPERATURE: 97.2 F | WEIGHT: 223.8 LBS | HEIGHT: 72 IN | SYSTOLIC BLOOD PRESSURE: 138 MMHG | BODY MASS INDEX: 30.31 KG/M2

## 2024-09-03 DIAGNOSIS — E11.3592 TYPE 2 DIABETES MELLITUS WITH LEFT EYE AFFECTED BY PROLIFERATIVE RETINOPATHY WITHOUT MACULAR EDEMA, WITHOUT LONG-TERM CURRENT USE OF INSULIN (MULTI): ICD-10-CM

## 2024-09-03 DIAGNOSIS — E66.09 CLASS 1 OBESITY DUE TO EXCESS CALORIES WITH SERIOUS COMORBIDITY AND BODY MASS INDEX (BMI) OF 30.0 TO 30.9 IN ADULT: ICD-10-CM

## 2024-09-03 DIAGNOSIS — H61.22 IMPACTED CERUMEN OF LEFT EAR: ICD-10-CM

## 2024-09-03 DIAGNOSIS — Z00.00 HEALTHCARE MAINTENANCE: ICD-10-CM

## 2024-09-03 DIAGNOSIS — E78.5 HYPERLIPIDEMIA, UNSPECIFIED HYPERLIPIDEMIA TYPE: ICD-10-CM

## 2024-09-03 DIAGNOSIS — E11.9 TYPE 2 DIABETES MELLITUS WITHOUT COMPLICATION, WITHOUT LONG-TERM CURRENT USE OF INSULIN (MULTI): ICD-10-CM

## 2024-09-03 DIAGNOSIS — Z12.11 SCREENING FOR COLON CANCER: ICD-10-CM

## 2024-09-03 DIAGNOSIS — Z12.5 SCREENING PSA (PROSTATE SPECIFIC ANTIGEN): ICD-10-CM

## 2024-09-03 DIAGNOSIS — E11.40 CONTROLLED TYPE 2 DIABETES WITH NEUROPATHY (MULTI): ICD-10-CM

## 2024-09-03 DIAGNOSIS — N52.9 ERECTILE DYSFUNCTION, UNSPECIFIED ERECTILE DYSFUNCTION TYPE: ICD-10-CM

## 2024-09-03 DIAGNOSIS — I10 PRIMARY HYPERTENSION: Primary | ICD-10-CM

## 2024-09-03 DIAGNOSIS — D64.9 ANEMIA, UNSPECIFIED TYPE: ICD-10-CM

## 2024-09-03 DIAGNOSIS — N18.32 STAGE 3B CHRONIC KIDNEY DISEASE (MULTI): ICD-10-CM

## 2024-09-03 DIAGNOSIS — Z51.81 ENCOUNTER FOR THERAPEUTIC DRUG LEVEL MONITORING: ICD-10-CM

## 2024-09-03 PROBLEM — G62.9 PERIPHERAL NEUROPATHY: Status: ACTIVE | Noted: 2024-09-03

## 2024-09-03 PROBLEM — M19.90 OA (OSTEOARTHRITIS): Status: ACTIVE | Noted: 2024-09-03

## 2024-09-03 PROBLEM — L30.9 ECZEMA: Status: ACTIVE | Noted: 2024-09-03

## 2024-09-03 PROCEDURE — 1159F MED LIST DOCD IN RCRD: CPT | Performed by: FAMILY MEDICINE

## 2024-09-03 PROCEDURE — G2211 COMPLEX E/M VISIT ADD ON: HCPCS | Performed by: FAMILY MEDICINE

## 2024-09-03 PROCEDURE — 3075F SYST BP GE 130 - 139MM HG: CPT | Performed by: FAMILY MEDICINE

## 2024-09-03 PROCEDURE — 3060F POS MICROALBUMINURIA REV: CPT | Performed by: FAMILY MEDICINE

## 2024-09-03 PROCEDURE — 3044F HG A1C LEVEL LT 7.0%: CPT | Performed by: FAMILY MEDICINE

## 2024-09-03 PROCEDURE — 99215 OFFICE O/P EST HI 40 MIN: CPT | Performed by: FAMILY MEDICINE

## 2024-09-03 PROCEDURE — 3008F BODY MASS INDEX DOCD: CPT | Performed by: FAMILY MEDICINE

## 2024-09-03 PROCEDURE — 1036F TOBACCO NON-USER: CPT | Performed by: FAMILY MEDICINE

## 2024-09-03 PROCEDURE — 3078F DIAST BP <80 MM HG: CPT | Performed by: FAMILY MEDICINE

## 2024-09-03 PROCEDURE — 1160F RVW MEDS BY RX/DR IN RCRD: CPT | Performed by: FAMILY MEDICINE

## 2024-09-03 PROCEDURE — 1123F ACP DISCUSS/DSCN MKR DOCD: CPT | Performed by: FAMILY MEDICINE

## 2024-09-03 PROCEDURE — 3048F LDL-C <100 MG/DL: CPT | Performed by: FAMILY MEDICINE

## 2024-09-03 RX ORDER — GABAPENTIN 100 MG/1
100 CAPSULE ORAL 3 TIMES DAILY
Qty: 270 CAPSULE | Refills: 1 | Status: SHIPPED | OUTPATIENT
Start: 2024-09-03 | End: 2025-03-02

## 2024-09-03 RX ORDER — DAPAGLIFLOZIN 5 MG/1
5 TABLET, FILM COATED ORAL DAILY
Qty: 90 TABLET | Refills: 1 | Status: SHIPPED | OUTPATIENT
Start: 2024-09-03

## 2024-09-03 RX ORDER — AMLODIPINE AND BENAZEPRIL HYDROCHLORIDE 10; 40 MG/1; MG/1
1 CAPSULE ORAL DAILY
COMMUNITY

## 2024-09-03 RX ORDER — ROSUVASTATIN CALCIUM 5 MG/1
5 TABLET, COATED ORAL DAILY
Qty: 90 TABLET | Refills: 1 | Status: SHIPPED | OUTPATIENT
Start: 2024-09-03 | End: 2025-03-02

## 2024-09-03 RX ORDER — HYDROCHLOROTHIAZIDE 25 MG/1
25 TABLET ORAL DAILY
Qty: 90 TABLET | Refills: 1 | Status: SHIPPED | OUTPATIENT
Start: 2024-09-03 | End: 2025-03-02

## 2024-09-03 RX ORDER — SILDENAFIL 100 MG/1
100 TABLET, FILM COATED ORAL AS NEEDED
Qty: 10 TABLET | Refills: 2 | Status: SHIPPED | OUTPATIENT
Start: 2024-09-03

## 2024-09-03 NOTE — ASSESSMENT & PLAN NOTE
Recommend Prevnar 20, shingles vaccine, RSV vaccine and flu shot which patient declines.  Recommend Cologuard colon cancer screening.  Screening blood work ordered.

## 2024-09-03 NOTE — ASSESSMENT & PLAN NOTE
Patient with mild anemia on previous blood work.  Will plan to recheck anemia and also check iron studies and B12.  Will discuss GI workup if persistent.

## 2024-09-03 NOTE — ASSESSMENT & PLAN NOTE
Diabetes is controlled however patient does have peripheral neuropathy.  Will start him on gabapentin.  Plan to also check TSH.  Also will have him see neurology as scheduled.
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
## 2024-09-19 ENCOUNTER — APPOINTMENT (OUTPATIENT)
Dept: PRIMARY CARE | Facility: CLINIC | Age: 69
End: 2024-09-19
Payer: MEDICARE

## 2024-09-20 ENCOUNTER — HOSPITAL ENCOUNTER (OUTPATIENT)
Dept: RADIOLOGY | Facility: CLINIC | Age: 69
Discharge: HOME | End: 2024-09-20
Payer: MEDICARE

## 2024-09-20 DIAGNOSIS — R94.31 ABNORMAL EKG: ICD-10-CM

## 2024-09-20 PROCEDURE — 75571 CT HRT W/O DYE W/CA TEST: CPT

## 2024-09-24 ENCOUNTER — TELEPHONE (OUTPATIENT)
Dept: PRIMARY CARE | Facility: CLINIC | Age: 69
End: 2024-09-24

## 2024-09-24 DIAGNOSIS — R93.1 AGATSTON CORONARY ARTERY CALCIUM SCORE GREATER THAN 400: Primary | ICD-10-CM

## 2024-09-24 NOTE — TELEPHONE ENCOUNTER
I spoke with patient to advise as instructed.  Patient had multiple questions regarding results.  Stated that he will wait until his follow up with you to consider seeing cardiologist.  Will discuss with you his concerns at his POV.      Patient advised that he is not having any symptoms of lung infection.

## 2024-09-24 NOTE — TELEPHONE ENCOUNTER
----- Message from Nena Jacobson sent at 9/24/2024 12:41 PM EDT -----  Please advise patient that coronary artery calcium score is 2331.  This is considered very high risk for coronary artery disease.  His ascending aorta is also mildly widened.  Would recommend he see cardiology for further evaluation.  Referral ordered.  They also saw possible infection in his left upper lung.  Please see if he is having any symptoms.

## 2024-09-25 ENCOUNTER — APPOINTMENT (OUTPATIENT)
Dept: NEPHROLOGY | Facility: CLINIC | Age: 69
End: 2024-09-25
Payer: MEDICARE

## 2024-09-25 VITALS
BODY MASS INDEX: 31.42 KG/M2 | HEART RATE: 79 BPM | DIASTOLIC BLOOD PRESSURE: 75 MMHG | SYSTOLIC BLOOD PRESSURE: 172 MMHG | WEIGHT: 232 LBS | HEIGHT: 72 IN

## 2024-09-25 DIAGNOSIS — R80.8 OTHER PROTEINURIA: ICD-10-CM

## 2024-09-25 DIAGNOSIS — I10 ESSENTIAL HYPERTENSION: ICD-10-CM

## 2024-09-25 DIAGNOSIS — N18.32 STAGE 3B CHRONIC KIDNEY DISEASE (MULTI): ICD-10-CM

## 2024-09-25 DIAGNOSIS — E08.22 DIABETES MELLITUS DUE TO UNDERLYING CONDITION WITH DIABETIC CHRONIC KIDNEY DISEASE, UNSPECIFIED CKD STAGE, UNSPECIFIED WHETHER LONG TERM INSULIN USE: Primary | ICD-10-CM

## 2024-09-25 PROCEDURE — 3048F LDL-C <100 MG/DL: CPT | Performed by: INTERNAL MEDICINE

## 2024-09-25 PROCEDURE — 3078F DIAST BP <80 MM HG: CPT | Performed by: INTERNAL MEDICINE

## 2024-09-25 PROCEDURE — 3044F HG A1C LEVEL LT 7.0%: CPT | Performed by: INTERNAL MEDICINE

## 2024-09-25 PROCEDURE — 3060F POS MICROALBUMINURIA REV: CPT | Performed by: INTERNAL MEDICINE

## 2024-09-25 PROCEDURE — 1036F TOBACCO NON-USER: CPT | Performed by: INTERNAL MEDICINE

## 2024-09-25 PROCEDURE — 99214 OFFICE O/P EST MOD 30 MIN: CPT | Performed by: INTERNAL MEDICINE

## 2024-09-25 PROCEDURE — 3008F BODY MASS INDEX DOCD: CPT | Performed by: INTERNAL MEDICINE

## 2024-09-25 PROCEDURE — 3077F SYST BP >= 140 MM HG: CPT | Performed by: INTERNAL MEDICINE

## 2024-09-25 PROCEDURE — 1159F MED LIST DOCD IN RCRD: CPT | Performed by: INTERNAL MEDICINE

## 2024-09-25 PROCEDURE — 1123F ACP DISCUSS/DSCN MKR DOCD: CPT | Performed by: INTERNAL MEDICINE

## 2024-09-25 RX ORDER — CHLORTHALIDONE 25 MG/1
25 TABLET ORAL DAILY
Qty: 90 TABLET | Refills: 3 | Status: SHIPPED | OUTPATIENT
Start: 2024-09-25 | End: 2025-09-25

## 2024-09-25 NOTE — PROGRESS NOTES
David Ortiz   69 y.o.    @@  [facility]/Room: [id number]/Room/bed info not found    Subjective:   The patient is being seen for a routine clinic follow-up of chronic kidney disease. Recently, the disease has been stable. Disease complications:  No hyperkalemia, no hypocalcemia, no hyperphosphatemia, no metabolic acidosis, no coagulopathy, no uremic encephalopathy, no neuropathy and no renal osteodystrophy. The patient is currently asymptomatic. No associated symptoms are reported.       Meds:   Current Outpatient Medications   Medication Sig Dispense Refill    amLODIPine-benazepriL (Lotrel) 10-40 mg capsule Take 1 capsule by mouth once daily.      cyanocobalamin (Vitamin B-12) 500 mcg tablet Take 1 tablet (500 mcg) by mouth once daily.      dapagliflozin propanediol (Farxiga) 5 mg Take 1 tablet (5 mg) by mouth once daily. 90 tablet 1    ergocalciferol (Vitamin D-2) 1.25 MG (51197 UT) capsule Take 1 capsule (50,000 Units) by mouth every 14 (fourteen) days. 6 capsule 2    flash glucose sensor kit kit Use as instructed 2 each 0    FreeStyle Cindy 2 Sensor kit Inject 3 each under the skin if needed (BG). Use as instructed 1 each 3    gabapentin (Neurontin) 100 mg capsule Take 1 capsule (100 mg) by mouth 3 times a day. 270 capsule 1    hydroCHLOROthiazide (HYDRODiuril) 25 mg tablet Take 1 tablet (25 mg) by mouth once daily. 90 tablet 1    rosuvastatin (Crestor) 5 mg tablet Take 1 tablet (5 mg) by mouth once daily. 90 tablet 1    sildenafil (Viagra) 100 mg tablet Take 1 tablet (100 mg) by mouth if needed for erectile dysfunction. 10 tablet 2     No current facility-administered medications for this visit.          ROS:  The patient is awake and oriented. No dizziness or lightheadedness. No chills and no fever. No headaches. No nausea and no vomiting. No shortness of breath. No cough. No sputum. No chest pain. No chest tightness. No abdominal pain. No diarrhea and no constipation. No hematemesis or hemoptysis. No hematuria.  No rectal bleeding. No melena. No epistaxis. No urinary symptoms. No flank pain. No leg edema. No leg pain. No weakness. No itching. Overall, the rest of the review of systems is also negative.  12 point review of systems otherwise negative as stated in HPI.        Physical Examination:        Vitals:    09/25/24 1148   BP: 172/75   Pulse: 79     General: The patient is awake, oriented, and is not in any distress.  Head and Neck: Normocephalic. No periorbital edema.  Eyes: non-icteric  Respiratory: Symmetric air entry. Symmetric chest expansion.No respiratory distress.  Skin: No maculopapular rash.  Musculoskeletal: No peripheral edema in both left and right upper extremities.  No edema in either left or right lower extremities.  Neuro Exam: Speech is fluent. Moves extremities.    Imaging:         Blood Labs:  No results found for this or any previous visit (from the past 24 hour(s)).   Lab Results   Component Value Date    .0 (H) 07/01/2024    PROTUR 30 (1+) (A) 11/01/2021      Lab Results   Component Value Date    GLUCOSE 152 (H) 07/01/2024    CALCIUM 8.6 07/01/2024     07/01/2024    K 5.2 07/01/2024    CO2 25 07/01/2024     07/01/2024    BUN 37 (H) 07/01/2024    CREATININE 1.78 (H) 07/01/2024         Assessment and Plan:  1 chronic kidney disease stage III.  Last creatinine level from July 2024 is 1.78.  I asked for a basic metabolic panel.  Kidney ultrasound pending.  I reordered kidney ultrasound.  I asked for PTH and 25-hydroxy vitamin D level.    2.  Proteinuria.  He has about 385 mg albuminuria.  This is probably because of diabetic nephropathy.  He is on maximum dose of benazepril.  He is also on Farxiga.    3.  Hypertension.  Blood pressure is high.  I replaced his hydrochlorothiazide with chlorthalidone.     4.  Diabetes.     I will see him in about 6 months for follow-up.          Coy Cortez MD  Senior Attending Physician  Director of Onco-Nephrology Program  Division of Nephrology  & Hypertension  [facility]

## 2024-09-26 DIAGNOSIS — I10 PRIMARY HYPERTENSION: Primary | ICD-10-CM

## 2024-09-26 RX ORDER — AMLODIPINE AND BENAZEPRIL HYDROCHLORIDE 10; 40 MG/1; MG/1
1 CAPSULE ORAL DAILY
Qty: 90 CAPSULE | Refills: 1 | Status: SHIPPED | OUTPATIENT
Start: 2024-09-26

## 2024-10-07 ENCOUNTER — APPOINTMENT (OUTPATIENT)
Dept: ORTHOPEDIC SURGERY | Facility: CLINIC | Age: 69
End: 2024-10-07
Payer: MEDICARE

## 2024-10-10 ENCOUNTER — LAB (OUTPATIENT)
Dept: LAB | Facility: LAB | Age: 69
End: 2024-10-10
Payer: MEDICARE

## 2024-10-10 DIAGNOSIS — Z12.5 SCREENING PSA (PROSTATE SPECIFIC ANTIGEN): ICD-10-CM

## 2024-10-10 DIAGNOSIS — I10 PRIMARY HYPERTENSION: ICD-10-CM

## 2024-10-10 DIAGNOSIS — E11.40 CONTROLLED TYPE 2 DIABETES WITH NEUROPATHY: ICD-10-CM

## 2024-10-10 DIAGNOSIS — E11.3592 TYPE 2 DIABETES MELLITUS WITH LEFT EYE AFFECTED BY PROLIFERATIVE RETINOPATHY WITHOUT MACULAR EDEMA, WITHOUT LONG-TERM CURRENT USE OF INSULIN: ICD-10-CM

## 2024-10-10 DIAGNOSIS — Z51.81 ENCOUNTER FOR THERAPEUTIC DRUG LEVEL MONITORING: ICD-10-CM

## 2024-10-10 LAB
ALBUMIN SERPL BCP-MCNC: 4.3 G/DL (ref 3.4–5)
ALP SERPL-CCNC: 41 U/L (ref 33–136)
ALT SERPL W P-5'-P-CCNC: 12 U/L (ref 10–52)
AMPHETAMINES UR QL SCN: NORMAL
ANION GAP SERPL CALC-SCNC: 12 MMOL/L (ref 10–20)
AST SERPL W P-5'-P-CCNC: 11 U/L (ref 9–39)
BARBITURATES UR QL SCN: NORMAL
BASOPHILS # BLD AUTO: 0.04 X10*3/UL (ref 0–0.1)
BASOPHILS NFR BLD AUTO: 0.5 %
BILIRUB SERPL-MCNC: 0.5 MG/DL (ref 0–1.2)
BUN SERPL-MCNC: 40 MG/DL (ref 6–23)
BZE UR QL SCN: NORMAL
CALCIUM SERPL-MCNC: 8.7 MG/DL (ref 8.6–10.3)
CANNABINOIDS UR QL SCN: NORMAL
CHLORIDE SERPL-SCNC: 108 MMOL/L (ref 98–107)
CHOLEST SERPL-MCNC: 127 MG/DL (ref 0–199)
CHOLESTEROL/HDL RATIO: 2.8
CO2 SERPL-SCNC: 24 MMOL/L (ref 21–32)
CREAT SERPL-MCNC: 1.99 MG/DL (ref 0.5–1.3)
CREAT UR-MCNC: 51.7 MG/DL (ref 20–370)
CREAT UR-MCNC: 53.2 MG/DL (ref 20–370)
EGFRCR SERPLBLD CKD-EPI 2021: 35 ML/MIN/1.73M*2
EOSINOPHIL # BLD AUTO: 0.23 X10*3/UL (ref 0–0.4)
EOSINOPHIL NFR BLD AUTO: 2.9 %
ERYTHROCYTE [DISTWIDTH] IN BLOOD BY AUTOMATED COUNT: 12.8 % (ref 11.5–14.5)
EST. AVERAGE GLUCOSE BLD GHB EST-MCNC: 151 MG/DL
GLUCOSE SERPL-MCNC: 162 MG/DL (ref 74–99)
HBA1C MFR BLD: 6.9 %
HCT VFR BLD AUTO: 32.8 % (ref 41–52)
HDLC SERPL-MCNC: 45.2 MG/DL
HGB BLD-MCNC: 10.6 G/DL (ref 13.5–17.5)
IMM GRANULOCYTES # BLD AUTO: 0.02 X10*3/UL (ref 0–0.5)
IMM GRANULOCYTES NFR BLD AUTO: 0.3 % (ref 0–0.9)
LDLC SERPL CALC-MCNC: 58 MG/DL
LYMPHOCYTES # BLD AUTO: 2.64 X10*3/UL (ref 0.8–3)
LYMPHOCYTES NFR BLD AUTO: 33.3 %
MCH RBC QN AUTO: 29 PG (ref 26–34)
MCHC RBC AUTO-ENTMCNC: 32.3 G/DL (ref 32–36)
MCV RBC AUTO: 90 FL (ref 80–100)
MICROALBUMIN UR-MCNC: 111.7 MG/L
MICROALBUMIN/CREAT UR: 216.1 UG/MG CREAT
MONOCYTES # BLD AUTO: 0.5 X10*3/UL (ref 0.05–0.8)
MONOCYTES NFR BLD AUTO: 6.3 %
NEUTROPHILS # BLD AUTO: 4.49 X10*3/UL (ref 1.6–5.5)
NEUTROPHILS NFR BLD AUTO: 56.7 %
NON HDL CHOLESTEROL: 82 MG/DL (ref 0–149)
NRBC BLD-RTO: 0 /100 WBCS (ref 0–0)
PCP UR QL SCN: NORMAL
PLATELET # BLD AUTO: 262 X10*3/UL (ref 150–450)
POTASSIUM SERPL-SCNC: 4.7 MMOL/L (ref 3.5–5.3)
PROT SERPL-MCNC: 6.9 G/DL (ref 6.4–8.2)
PSA SERPL-MCNC: 2.4 NG/ML
RBC # BLD AUTO: 3.65 X10*6/UL (ref 4.5–5.9)
SODIUM SERPL-SCNC: 139 MMOL/L (ref 136–145)
TRIGL SERPL-MCNC: 120 MG/DL (ref 0–149)
TSH SERPL-ACNC: 1.39 MIU/L (ref 0.44–3.98)
VIT B12 SERPL-MCNC: 541 PG/ML (ref 211–911)
VLDL: 24 MG/DL (ref 0–40)
WBC # BLD AUTO: 7.9 X10*3/UL (ref 4.4–11.3)

## 2024-10-10 PROCEDURE — 80061 LIPID PANEL: CPT

## 2024-10-10 PROCEDURE — 80373 DRUG SCREENING TRAMADOL: CPT

## 2024-10-10 PROCEDURE — 80361 OPIATES 1 OR MORE: CPT

## 2024-10-10 PROCEDURE — 82728 ASSAY OF FERRITIN: CPT

## 2024-10-10 PROCEDURE — 80346 BENZODIAZEPINES1-12: CPT

## 2024-10-10 PROCEDURE — 82607 VITAMIN B-12: CPT

## 2024-10-10 PROCEDURE — G0103 PSA SCREENING: HCPCS

## 2024-10-10 PROCEDURE — 85025 COMPLETE CBC W/AUTO DIFF WBC: CPT

## 2024-10-10 PROCEDURE — 80307 DRUG TEST PRSMV CHEM ANLYZR: CPT

## 2024-10-10 PROCEDURE — 83036 HEMOGLOBIN GLYCOSYLATED A1C: CPT

## 2024-10-10 PROCEDURE — 80053 COMPREHEN METABOLIC PANEL: CPT

## 2024-10-10 PROCEDURE — 80354 DRUG SCREENING FENTANYL: CPT

## 2024-10-10 PROCEDURE — 80368 SEDATIVE HYPNOTICS: CPT

## 2024-10-10 PROCEDURE — 80365 DRUG SCREENING OXYCODONE: CPT

## 2024-10-10 PROCEDURE — 80358 DRUG SCREENING METHADONE: CPT

## 2024-10-10 PROCEDURE — 84443 ASSAY THYROID STIM HORMONE: CPT

## 2024-10-10 PROCEDURE — 36415 COLL VENOUS BLD VENIPUNCTURE: CPT

## 2024-10-10 PROCEDURE — 82570 ASSAY OF URINE CREATININE: CPT

## 2024-10-10 PROCEDURE — 82043 UR ALBUMIN QUANTITATIVE: CPT

## 2024-10-10 PROCEDURE — 83540 ASSAY OF IRON: CPT

## 2024-10-10 PROCEDURE — 83550 IRON BINDING TEST: CPT

## 2024-10-14 ENCOUNTER — APPOINTMENT (OUTPATIENT)
Dept: PRIMARY CARE | Facility: CLINIC | Age: 69
End: 2024-10-14
Payer: MEDICARE

## 2024-10-14 VITALS
HEART RATE: 80 BPM | HEIGHT: 72 IN | DIASTOLIC BLOOD PRESSURE: 72 MMHG | WEIGHT: 233 LBS | BODY MASS INDEX: 31.56 KG/M2 | SYSTOLIC BLOOD PRESSURE: 138 MMHG

## 2024-10-14 DIAGNOSIS — E55.9 VITAMIN D DEFICIENCY: ICD-10-CM

## 2024-10-14 DIAGNOSIS — N18.32 STAGE 3B CHRONIC KIDNEY DISEASE (MULTI): ICD-10-CM

## 2024-10-14 DIAGNOSIS — R93.1 AGATSTON CORONARY ARTERY CALCIUM SCORE GREATER THAN 400: ICD-10-CM

## 2024-10-14 DIAGNOSIS — D64.9 ANEMIA, UNSPECIFIED TYPE: ICD-10-CM

## 2024-10-14 DIAGNOSIS — E11.40 CONTROLLED TYPE 2 DIABETES WITH NEUROPATHY: ICD-10-CM

## 2024-10-14 DIAGNOSIS — I10 PRIMARY HYPERTENSION: Primary | ICD-10-CM

## 2024-10-14 LAB
FERRITIN SERPL-MCNC: 197 NG/ML (ref 20–300)
IRON SATN MFR SERPL: 21 % (ref 25–45)
IRON SERPL-MCNC: 61 UG/DL (ref 35–150)
TIBC SERPL-MCNC: 291 UG/DL (ref 240–445)
UIBC SERPL-MCNC: 230 UG/DL (ref 110–370)

## 2024-10-14 PROCEDURE — 3048F LDL-C <100 MG/DL: CPT | Performed by: FAMILY MEDICINE

## 2024-10-14 PROCEDURE — 1160F RVW MEDS BY RX/DR IN RCRD: CPT | Performed by: FAMILY MEDICINE

## 2024-10-14 PROCEDURE — 3044F HG A1C LEVEL LT 7.0%: CPT | Performed by: FAMILY MEDICINE

## 2024-10-14 PROCEDURE — 3078F DIAST BP <80 MM HG: CPT | Performed by: FAMILY MEDICINE

## 2024-10-14 PROCEDURE — 1123F ACP DISCUSS/DSCN MKR DOCD: CPT | Performed by: FAMILY MEDICINE

## 2024-10-14 PROCEDURE — 1159F MED LIST DOCD IN RCRD: CPT | Performed by: FAMILY MEDICINE

## 2024-10-14 PROCEDURE — 1036F TOBACCO NON-USER: CPT | Performed by: FAMILY MEDICINE

## 2024-10-14 PROCEDURE — 3075F SYST BP GE 130 - 139MM HG: CPT | Performed by: FAMILY MEDICINE

## 2024-10-14 PROCEDURE — 3008F BODY MASS INDEX DOCD: CPT | Performed by: FAMILY MEDICINE

## 2024-10-14 PROCEDURE — 99214 OFFICE O/P EST MOD 30 MIN: CPT | Performed by: FAMILY MEDICINE

## 2024-10-14 PROCEDURE — 3060F POS MICROALBUMINURIA REV: CPT | Performed by: FAMILY MEDICINE

## 2024-10-14 PROCEDURE — G2211 COMPLEX E/M VISIT ADD ON: HCPCS | Performed by: FAMILY MEDICINE

## 2024-10-14 NOTE — ASSESSMENT & PLAN NOTE
Kidney function stable.  Continue with current medications and continue follow-up with nephrology.

## 2024-10-14 NOTE — ASSESSMENT & PLAN NOTE
Patient anemia has slightly worsened.  Will check iron studies.  B12 levels are in the normal range.  May be anemia of chronic disease.  Recommend seeing GI as he has not had a colonoscopy in about 20 years.  Plan to recheck blood work in a few months.

## 2024-10-14 NOTE — ASSESSMENT & PLAN NOTE
Diabetes is controlled.  Continue with diabetic diet and exercise.  See ophthalmology.  Check feet.  Continue with ivet PANIAGUA.

## 2024-10-14 NOTE — ASSESSMENT & PLAN NOTE
Again recommend patient see cardiology.  Blood pressure improved.  Continue with statin.  LDL under 70.

## 2024-10-14 NOTE — PROGRESS NOTES
Subjective   Patient ID: David Ortiz is a 69 y.o. male who presents for Follow-up.  Patient presents today for follow-up on his chronic medical problems.  He reports that overall he has been feeling well.  He states that since starting the gabapentin that tingling in his legs has helped.  He still feels off balance.  He does have an appointment scheduled with neurology to review this.  He states that otherwise he has been doing okay.  He has been monitoring his blood pressure at home and has been running running 130 over 80s.  He denies any chest pain or shortness of breath or abdominal pain.  We reviewed that his coronary artery calcium score was over 2000.  He has not seen cardiology yet.  He did have anemia on his recent blood work.  He denies any other concerns.  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   Tobacco Use    Smoking status: Never    Smokeless tobacco: Never   Vaping Use    Vaping status: Never Used   Substance and Sexual Activity    Alcohol use: Yes     Comment: a few servings 1-2 times a week    Drug use: Never    Sexual activity: Not on file   Other Topics Concern    Not on file   Social History Narrative    Not on file     Social Determinants of Health     Financial Resource Strain: Not on file   Food Insecurity: Not on file   Transportation Needs: Not on file   Physical Activity: Not on file   Stress: Not on file   Social Connections: Not on file   Intimate Partner Violence: Not on file   Housing Stability: Not on file     Current Outpatient Medications   Medication Sig Dispense Refill    amLODIPine-benazepriL (Lotrel) 10-40 mg capsule Take 1 capsule by mouth once daily. 90 capsule 1    chlorthalidone (Hygroton) 25 mg tablet Take 1 tablet (25 mg) by mouth once daily. 90  tablet 3    cyanocobalamin (Vitamin B-12) 500 mcg tablet Take 1 tablet (500 mcg) by mouth once daily.      dapagliflozin propanediol (Farxiga) 5 mg Take 1 tablet (5 mg) by mouth once daily. 90 tablet 1    ergocalciferol (Vitamin D-2) 1.25 MG (31002 UT) capsule Take 1 capsule (50,000 Units) by mouth every 14 (fourteen) days. 6 capsule 2    flash glucose sensor kit kit Use as instructed 2 each 0    FreeStyle Cindy 2 Sensor kit Inject 3 each under the skin if needed (BG). Use as instructed 1 each 3    gabapentin (Neurontin) 100 mg capsule Take 1 capsule (100 mg) by mouth 3 times a day. 270 capsule 1    rosuvastatin (Crestor) 5 mg tablet Take 1 tablet (5 mg) by mouth once daily. 90 tablet 1    sildenafil (Viagra) 100 mg tablet Take 1 tablet (100 mg) by mouth if needed for erectile dysfunction. 10 tablet 2     No current facility-administered medications for this visit.     Family History   Problem Relation Name Age of Onset    Heart attack Mother      No Known Problems Father       Review of Systems  Immunization History   Administered Date(s) Administered    Influenza Whole 12/20/2006    Influenza, Unspecified 11/20/2002    Td vaccine, age 7 years and older (TDVAX) 01/01/2000       Review of Systems negative except as noted in HPI and Chief complaint.     Objective                 /72   Pulse 80   Ht 1.829 m (6')   Wt 106 kg (233 lb)   BMI 31.60 kg/m²    Physical Exam  Vitals reviewed.   HENT:      Head: Normocephalic and atraumatic.      Right Ear: Tympanic membrane normal.      Left Ear: Tympanic membrane normal.      Nose: Nose normal.      Mouth/Throat:      Pharynx: Oropharynx is clear.   Eyes:      Extraocular Movements: Extraocular movements intact.      Conjunctiva/sclera: Conjunctivae normal.      Pupils: Pupils are equal, round, and reactive to light.   Cardiovascular:      Rate and Rhythm: Normal rate and regular rhythm.      Pulses: Normal pulses.   Pulmonary:      Effort: Pulmonary effort is  "normal.      Breath sounds: Normal breath sounds.   Abdominal:      General: There is no distension.      Palpations: Abdomen is soft.      Tenderness: There is no abdominal tenderness.   Musculoskeletal:         General: Normal range of motion.      Cervical back: Normal range of motion and neck supple.      Right lower leg: No edema.      Left lower leg: No edema.   Lymphadenopathy:      Cervical: No cervical adenopathy.   Neurological:      General: No focal deficit present.      Mental Status: He is alert.       No results found for this or any previous visit (from the past 96 hour(s)).  Lab Results   Component Value Date    WBC 7.9 10/10/2024    HGB 10.6 (L) 10/10/2024    HCT 32.8 (L) 10/10/2024    MCV 90 10/10/2024     10/10/2024     Lab Results   Component Value Date    GLUCOSE 162 (H) 10/10/2024    CALCIUM 8.7 10/10/2024     10/10/2024    K 4.7 10/10/2024    CO2 24 10/10/2024     (H) 10/10/2024    BUN 40 (H) 10/10/2024    CREATININE 1.99 (H) 10/10/2024     Lab Results   Component Value Date    CHOL 127 10/10/2024    CHOL 111 07/01/2024    CHOL 147 09/08/2023     Lab Results   Component Value Date    HDL 45.2 10/10/2024    HDL 44.1 07/01/2024    HDL 37.7 (A) 09/08/2023     Lab Results   Component Value Date    LDLCALC 58 10/10/2024    LDLCALC 54 07/01/2024     Lab Results   Component Value Date    TRIG 120 10/10/2024    TRIG 67 07/01/2024    TRIG 148 09/08/2023     No components found for: \"CHOLHDL\"   Lab Results   Component Value Date    TSH 1.39 10/10/2024      Lab Results   Component Value Date    HGBA1C 6.9 (H) 10/10/2024      Lab Results   Component Value Date    ALBUMINUR 111.7 10/10/2024      Assessment/Plan   Problem List Items Addressed This Visit       Controlled type 2 diabetes with neuropathy     Diabetes is controlled.  Continue with diabetic diet and exercise.  See ophthalmology.  Check feet.  Continue with fark [employer].         Relevant Orders    Hemoglobin A1c    Primary " hypertension - Primary     Blood pressure controlled.  Continue with current medications.  Reevaluate at follow-up.         Stage 3b chronic kidney disease (Multi)     Kidney function stable.  Continue with current medications and continue follow-up with nephrology.         Anemia     Patient anemia has slightly worsened.  Will check iron studies.  B12 levels are in the normal range.  May be anemia of chronic disease.  Recommend seeing GI as he has not had a colonoscopy in about 20 years.  Plan to recheck blood work in a few months.         Relevant Orders    Iron and TIBC (Completed)    Ferritin (Completed)    Folate    Vitamin B12    Iron and TIBC    Ferritin    Comprehensive Metabolic Panel    CBC and Auto Differential    Referral to Gastroenterology    [facility] coronary artery calcium score greater than 400     Again recommend patient see cardiology.  Blood pressure improved.  Continue with statin.  LDL under 70.          Other Visit Diagnoses       Vitamin D deficiency        Relevant Orders    Vitamin D 25-Hydroxy,Total (for eval of Vitamin D levels)

## 2024-10-15 ENCOUNTER — TELEPHONE (OUTPATIENT)
Dept: PRIMARY CARE | Facility: CLINIC | Age: 69
End: 2024-10-15
Payer: MEDICARE

## 2024-10-15 NOTE — TELEPHONE ENCOUNTER
----- Message from Nena Jacobson sent at 10/15/2024  7:46 AM EDT -----  Please advise patient that iron level is borderline low.  Will plan to recheck at follow-up.

## 2024-11-22 ENCOUNTER — TELEPHONE (OUTPATIENT)
Dept: GASTROENTEROLOGY | Facility: CLINIC | Age: 69
End: 2024-11-22
Payer: MEDICARE

## 2025-01-03 ENCOUNTER — APPOINTMENT (OUTPATIENT)
Dept: CARDIOLOGY | Facility: CLINIC | Age: 70
End: 2025-01-03
Payer: MEDICARE

## 2025-01-03 VITALS
OXYGEN SATURATION: 99 % | HEART RATE: 72 BPM | SYSTOLIC BLOOD PRESSURE: 146 MMHG | WEIGHT: 233 LBS | DIASTOLIC BLOOD PRESSURE: 76 MMHG | BODY MASS INDEX: 31.6 KG/M2

## 2025-01-03 DIAGNOSIS — I71.21 ANEURYSM OF ASCENDING AORTA WITHOUT RUPTURE (CMS-HCC): ICD-10-CM

## 2025-01-03 DIAGNOSIS — E78.2 MIXED HYPERLIPIDEMIA: ICD-10-CM

## 2025-01-03 DIAGNOSIS — R94.31 ABNORMAL EKG: ICD-10-CM

## 2025-01-03 DIAGNOSIS — R93.1 AGATSTON CORONARY ARTERY CALCIUM SCORE GREATER THAN 400: ICD-10-CM

## 2025-01-03 DIAGNOSIS — I10 PRIMARY HYPERTENSION: Primary | ICD-10-CM

## 2025-01-03 PROCEDURE — 99204 OFFICE O/P NEW MOD 45 MIN: CPT | Performed by: INTERNAL MEDICINE

## 2025-01-03 PROCEDURE — 3077F SYST BP >= 140 MM HG: CPT | Performed by: INTERNAL MEDICINE

## 2025-01-03 PROCEDURE — 1036F TOBACCO NON-USER: CPT | Performed by: INTERNAL MEDICINE

## 2025-01-03 PROCEDURE — 1159F MED LIST DOCD IN RCRD: CPT | Performed by: INTERNAL MEDICINE

## 2025-01-03 PROCEDURE — 1160F RVW MEDS BY RX/DR IN RCRD: CPT | Performed by: INTERNAL MEDICINE

## 2025-01-03 PROCEDURE — 3078F DIAST BP <80 MM HG: CPT | Performed by: INTERNAL MEDICINE

## 2025-01-03 PROCEDURE — 1123F ACP DISCUSS/DSCN MKR DOCD: CPT | Performed by: INTERNAL MEDICINE

## 2025-01-03 RX ORDER — NAPROXEN SODIUM 220 MG/1
81 TABLET, FILM COATED ORAL DAILY
Qty: 90 TABLET | Refills: 3 | Status: SHIPPED | OUTPATIENT
Start: 2025-01-03 | End: 2026-01-03

## 2025-01-03 RX ORDER — CARVEDILOL 6.25 MG/1
6.25 TABLET ORAL
Qty: 180 TABLET | Refills: 1 | Status: SHIPPED | OUTPATIENT
Start: 2025-01-03 | End: 2025-07-02

## 2025-01-03 RX ORDER — AMINOPHYLLINE 25 MG/ML
125 INJECTION, SOLUTION INTRAVENOUS ONCE AS NEEDED
OUTPATIENT
Start: 2025-01-03

## 2025-01-03 RX ORDER — REGADENOSON 0.08 MG/ML
0.4 INJECTION, SOLUTION INTRAVENOUS
OUTPATIENT
Start: 2025-01-03

## 2025-01-03 ASSESSMENT — ENCOUNTER SYMPTOMS
WEAKNESS: 1
HEMATURIA: 0
CONFUSION: 1
PALPITATIONS: 0
FATIGUE: 1
ABDOMINAL DISTENTION: 1
BRUISES/BLEEDS EASILY: 1
NUMBNESS: 1
DYSURIA: 0
DIFFICULTY URINATING: 1
RESPIRATORY NEGATIVE: 1
ARTHRALGIAS: 1

## 2025-01-03 NOTE — PATIENT INSTRUCTIONS
Add carvedilol 6.25 mg twice daily  Continue all other medications    Get stress test due to heavily calcified heart vessels (marker for cholesterol plaque in your heart vessels).     Follow-up after stress test.

## 2025-01-12 PROBLEM — E11.69 HYPERLIPIDEMIA ASSOCIATED WITH TYPE 2 DIABETES MELLITUS (MULTI): Status: ACTIVE | Noted: 2023-12-19

## 2025-01-16 ENCOUNTER — APPOINTMENT (OUTPATIENT)
Dept: PRIMARY CARE | Facility: CLINIC | Age: 70
End: 2025-01-16
Payer: MEDICARE

## 2025-01-16 VITALS
OXYGEN SATURATION: 97 % | DIASTOLIC BLOOD PRESSURE: 78 MMHG | HEART RATE: 82 BPM | TEMPERATURE: 97.5 F | WEIGHT: 235 LBS | SYSTOLIC BLOOD PRESSURE: 138 MMHG | BODY MASS INDEX: 31.87 KG/M2

## 2025-01-16 DIAGNOSIS — E78.5 HYPERLIPIDEMIA ASSOCIATED WITH TYPE 2 DIABETES MELLITUS (MULTI): ICD-10-CM

## 2025-01-16 DIAGNOSIS — E11.40 CONTROLLED TYPE 2 DIABETES WITH NEUROPATHY: Primary | ICD-10-CM

## 2025-01-16 DIAGNOSIS — E11.69 HYPERLIPIDEMIA ASSOCIATED WITH TYPE 2 DIABETES MELLITUS (MULTI): ICD-10-CM

## 2025-01-16 DIAGNOSIS — N18.32 STAGE 3B CHRONIC KIDNEY DISEASE (MULTI): ICD-10-CM

## 2025-01-16 DIAGNOSIS — E11.3592 TYPE 2 DIABETES MELLITUS WITH LEFT EYE AFFECTED BY PROLIFERATIVE RETINOPATHY WITHOUT MACULAR EDEMA, WITHOUT LONG-TERM CURRENT USE OF INSULIN: ICD-10-CM

## 2025-01-16 DIAGNOSIS — D64.9 ANEMIA, UNSPECIFIED TYPE: ICD-10-CM

## 2025-01-16 DIAGNOSIS — I71.21 ANEURYSM OF ASCENDING AORTA WITHOUT RUPTURE (CMS-HCC): ICD-10-CM

## 2025-01-16 DIAGNOSIS — R93.1 AGATSTON CORONARY ARTERY CALCIUM SCORE GREATER THAN 400: ICD-10-CM

## 2025-01-16 DIAGNOSIS — I10 PRIMARY HYPERTENSION: ICD-10-CM

## 2025-01-16 PROCEDURE — 3078F DIAST BP <80 MM HG: CPT | Performed by: FAMILY MEDICINE

## 2025-01-16 PROCEDURE — 99214 OFFICE O/P EST MOD 30 MIN: CPT | Performed by: FAMILY MEDICINE

## 2025-01-16 PROCEDURE — 1159F MED LIST DOCD IN RCRD: CPT | Performed by: FAMILY MEDICINE

## 2025-01-16 PROCEDURE — 3075F SYST BP GE 130 - 139MM HG: CPT | Performed by: FAMILY MEDICINE

## 2025-01-16 PROCEDURE — 1123F ACP DISCUSS/DSCN MKR DOCD: CPT | Performed by: FAMILY MEDICINE

## 2025-01-16 PROCEDURE — 1160F RVW MEDS BY RX/DR IN RCRD: CPT | Performed by: FAMILY MEDICINE

## 2025-01-16 PROCEDURE — 1036F TOBACCO NON-USER: CPT | Performed by: FAMILY MEDICINE

## 2025-01-16 RX ORDER — GABAPENTIN 300 MG/1
300 CAPSULE ORAL 3 TIMES DAILY
Qty: 270 CAPSULE | Refills: 0 | Status: SHIPPED | OUTPATIENT
Start: 2025-01-16 | End: 2025-04-16

## 2025-01-16 NOTE — PROGRESS NOTES
Subjective   Patient ID: David Ortiz is a 69 y.o. male who presents for Follow-up (Would like to discuss medication ) and Neuro Problem (Both feet).  Patient presents today for follow-up on his chronic medical problems.  He reports that he saw the cardiologist.  His coronary artery calcium score was over 2000.  She advised starting aspirin and carvedilol which he has not started yet.  He was concerned about taking another blood pressure medicine.  He states at home his blood pressures are usually 130s and 140s over 70s and 80s.  He states that the gabapentin has helped with his leg pain but he still having some pain.  He does have an appoint with neurology coming up.  He would like to increase his gabapentin.  He denies any chest pain or shortness of breath or abdominal pain.  He denies any other concerns.  Neuro Problem      Social History     Socioeconomic History    Marital status:      Spouse name: Not on file    Number of children: Not on file    Years of education: Not on file    Highest education level: Not on file   Occupational History    Occupation: [employer]   Tobacco Use    Smoking status: Never    Smokeless tobacco: Never   Vaping Use    Vaping status: Never Used   Substance and Sexual Activity    Alcohol use: Yes     Comment: a few servings 1-2 times a week    Drug use: Never    Sexual activity: Not on file   Other Topics Concern    Not on file   Social History Narrative    Not on file     Social Drivers of Health     Financial Resource Strain: Not on file   Food Insecurity: Not on file   Transportation Needs: Not on file   Physical Activity: Not on file   Stress: Not on file   Social Connections: Not on file   Intimate Partner Violence: Not on file   Housing Stability: Not on file     Current Outpatient Medications   Medication Sig Dispense Refill    amLODIPine-benazepriL (Lotrel) 10-40 mg capsule Take 1 capsule by mouth once daily. 90 capsule 1    chlorthalidone (Hygroton) 25 mg tablet  Take 1 tablet (25 mg) by mouth once daily. 90 tablet 3    cyanocobalamin (Vitamin B-12) 500 mcg tablet Take 1 tablet (500 mcg) by mouth once daily.      dapagliflozin propanediol (Farxiga) 5 mg Take 1 tablet (5 mg) by mouth once daily. 90 tablet 1    ergocalciferol (Vitamin D-2) 1.25 MG (27811 UT) capsule Take 1 capsule (50,000 Units) by mouth every 14 (fourteen) days. 6 capsule 2    flash glucose sensor kit kit Use as instructed 2 each 0    rosuvastatin (Crestor) 5 mg tablet Take 1 tablet (5 mg) by mouth once daily. 90 tablet 1    sildenafil (Viagra) 100 mg tablet Take 1 tablet (100 mg) by mouth if needed for erectile dysfunction. 10 tablet 2    aspirin 81 mg chewable tablet Chew 1 tablet (81 mg) once daily. (Patient not taking: Reported on 1/16/2025) 90 tablet 3    carvedilol (Coreg) 6.25 mg tablet Take 1 tablet (6.25 mg) by mouth 2 times daily (morning and late afternoon). (Patient not taking: Reported on 1/16/2025) 180 tablet 1    gabapentin (Neurontin) 300 mg capsule Take 1 capsule (300 mg) by mouth 3 times a day. 270 capsule 0     No current facility-administered medications for this visit.     Family History   Problem Relation Name Age of Onset    Heart attack Mother      No Known Problems Father       Review of Systems  Immunization History   Administered Date(s) Administered    Influenza Whole 12/20/2006    Influenza, Unspecified 11/20/2002    Td vaccine, age 7 years and older (TDVAX) 01/01/2000       Review of Systems negative except as noted in HPI and Chief complaint.     Objective                 /78   Pulse 82   Temp 36.4 °C (97.5 °F) (Skin)   Wt 107 kg (235 lb)   SpO2 97%   BMI 31.87 kg/m²    Physical Exam  Vitals reviewed.   HENT:      Head: Normocephalic and atraumatic.      Right Ear: Tympanic membrane normal.      Left Ear: Tympanic membrane normal.      Nose: Nose normal.      Mouth/Throat:      Pharynx: Oropharynx is clear.   Eyes:      Extraocular Movements: Extraocular movements  "intact.      Conjunctiva/sclera: Conjunctivae normal.      Pupils: Pupils are equal, round, and reactive to light.   Cardiovascular:      Rate and Rhythm: Normal rate and regular rhythm.      Pulses: Normal pulses.   Pulmonary:      Effort: Pulmonary effort is normal.      Breath sounds: Normal breath sounds.   Abdominal:      General: There is no distension.      Palpations: Abdomen is soft.      Tenderness: There is no abdominal tenderness.   Musculoskeletal:         General: Normal range of motion.      Cervical back: Normal range of motion and neck supple.      Right lower leg: No edema.      Left lower leg: No edema.   Lymphadenopathy:      Cervical: No cervical adenopathy.   Neurological:      General: No focal deficit present.      Mental Status: He is alert.       No results found for this or any previous visit (from the past 96 hours).  Lab Results   Component Value Date    WBC 7.9 10/10/2024    HGB 10.6 (L) 10/10/2024    HCT 32.8 (L) 10/10/2024    MCV 90 10/10/2024     10/10/2024     Lab Results   Component Value Date    GLUCOSE 162 (H) 10/10/2024    CALCIUM 8.7 10/10/2024     10/10/2024    K 4.7 10/10/2024    CO2 24 10/10/2024     (H) 10/10/2024    BUN 40 (H) 10/10/2024    CREATININE 1.99 (H) 10/10/2024     Lab Results   Component Value Date    CHOL 127 10/10/2024    CHOL 111 07/01/2024    CHOL 147 09/08/2023     Lab Results   Component Value Date    HDL 45.2 10/10/2024    HDL 44.1 07/01/2024    HDL 37.7 (A) 09/08/2023     Lab Results   Component Value Date    LDLCALC 58 10/10/2024    LDLCALC 54 07/01/2024     Lab Results   Component Value Date    TRIG 120 10/10/2024    TRIG 67 07/01/2024    TRIG 148 09/08/2023     No components found for: \"CHOLHDL\"   Lab Results   Component Value Date    TSH 1.39 10/10/2024      Lab Results   Component Value Date    HGBA1C 6.9 (H) 10/10/2024      Lab Results   Component Value Date    ALBUMINUR 111.7 10/10/2024      Assessment/Plan   Problem List Items " Addressed This Visit       Controlled type 2 diabetes with neuropathy - Primary     Continue with diabetic diet and exercise.  See ophthalmology.  Check feet.  Diabetes is well-controlled.  Check blood work as ordered.  Continue with current medications.  Patient complaining of pain related to diabetic neuropathy.  Some improvement with gabapentin.  This will be increased to 300 mg 3 times daily.  Patient also to see neurology for further evaluation.         Relevant Medications    gabapentin (Neurontin) 300 mg capsule    Other Relevant Orders    Hemoglobin A1c    Lipid Panel    Albumin-Creatinine Ratio, Urine Random    Primary hypertension     Blood pressure is borderline.  Recommend he start Coreg as prescribed by cardiology.  Continue with current medications.         Stage 3b chronic kidney disease (Multi)     Stable.  Continue follow-up with nephrology.         Hyperlipidemia associated with type 2 diabetes mellitus (Multi)     Cholesterol is under good control.  Continue with statin.         Anemia     Patient with mild anemia that slightly worsened.  Recommend seeing GI for evaluation which he declines.  May be related to kidney function.  Plan to recheck blood work as ordered.         Relevant Orders    Folate    Vitamin B12    Iron and TIBC    Ferritin    Comprehensive Metabolic Panel    CBC and Auto Differential    [place] coronary artery calcium score greater than 400     Start aspirin and beta-blocker as prescribed by cardiology.  Recommend he proceed with stress test.         Aneurysm of ascending aorta without rupture (CMS-HCC)     Continue with blood pressure control.  Continue monitoring with cardiology.         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  Also continue follow-up with ophthalmology.

## 2025-01-16 NOTE — ASSESSMENT & PLAN NOTE
Continue with diabetic diet and exercise.  See ophthalmology.  Check feet.  Diabetes is well-controlled.  Check blood work as ordered.  Continue with current medications.  Patient complaining of pain related to diabetic neuropathy.  Some improvement with gabapentin.  This will be increased to 300 mg 3 times daily.  Patient also to see neurology for further evaluation.

## 2025-01-16 NOTE — ASSESSMENT & PLAN NOTE
Patient with mild anemia that slightly worsened.  Recommend seeing GI for evaluation which he declines.  May be related to kidney function.  Plan to recheck blood work as ordered.

## 2025-01-16 NOTE — ASSESSMENT & PLAN NOTE
Start aspirin and beta-blocker as prescribed by cardiology.  Recommend he proceed with stress test.

## 2025-01-16 NOTE — ASSESSMENT & PLAN NOTE
Blood pressure is borderline.  Recommend he start Coreg as prescribed by cardiology.  Continue with current medications.

## 2025-01-22 ENCOUNTER — APPOINTMENT (OUTPATIENT)
Dept: NEUROLOGY | Facility: CLINIC | Age: 70
End: 2025-01-22
Payer: MEDICARE

## 2025-01-22 VITALS
TEMPERATURE: 97.3 F | SYSTOLIC BLOOD PRESSURE: 174 MMHG | WEIGHT: 236.6 LBS | HEIGHT: 72 IN | BODY MASS INDEX: 32.05 KG/M2 | HEART RATE: 75 BPM | DIASTOLIC BLOOD PRESSURE: 90 MMHG

## 2025-01-22 DIAGNOSIS — G62.9 POLYNEUROPATHY: Primary | ICD-10-CM

## 2025-01-22 PROCEDURE — 3080F DIAST BP >= 90 MM HG: CPT | Performed by: PSYCHIATRY & NEUROLOGY

## 2025-01-22 PROCEDURE — 3008F BODY MASS INDEX DOCD: CPT | Performed by: PSYCHIATRY & NEUROLOGY

## 2025-01-22 PROCEDURE — G2211 COMPLEX E/M VISIT ADD ON: HCPCS | Performed by: PSYCHIATRY & NEUROLOGY

## 2025-01-22 PROCEDURE — 1036F TOBACCO NON-USER: CPT | Performed by: PSYCHIATRY & NEUROLOGY

## 2025-01-22 PROCEDURE — 3077F SYST BP >= 140 MM HG: CPT | Performed by: PSYCHIATRY & NEUROLOGY

## 2025-01-22 PROCEDURE — 1123F ACP DISCUSS/DSCN MKR DOCD: CPT | Performed by: PSYCHIATRY & NEUROLOGY

## 2025-01-22 PROCEDURE — 99204 OFFICE O/P NEW MOD 45 MIN: CPT | Performed by: PSYCHIATRY & NEUROLOGY

## 2025-01-22 PROCEDURE — 1159F MED LIST DOCD IN RCRD: CPT | Performed by: PSYCHIATRY & NEUROLOGY

## 2025-01-22 ASSESSMENT — LIFESTYLE VARIABLES
HOW OFTEN DO YOU HAVE A DRINK CONTAINING ALCOHOL: 2-3 TIMES A WEEK
AUDIT-C TOTAL SCORE: 3
SKIP TO QUESTIONS 9-10: 1
HOW MANY STANDARD DRINKS CONTAINING ALCOHOL DO YOU HAVE ON A TYPICAL DAY: 1 OR 2
HOW OFTEN DO YOU HAVE SIX OR MORE DRINKS ON ONE OCCASION: NEVER

## 2025-01-22 ASSESSMENT — PATIENT HEALTH QUESTIONNAIRE - PHQ9
1. LITTLE INTEREST OR PLEASURE IN DOING THINGS: NOT AT ALL
2. FEELING DOWN, DEPRESSED OR HOPELESS: NOT AT ALL
SUM OF ALL RESPONSES TO PHQ9 QUESTIONS 1 & 2: 0

## 2025-01-22 NOTE — PROGRESS NOTES
Consulting Physician: Dr. Jacobson    Chief Complaint:  Neuropathy    History Of Present Illness  David Ortiz is a 69 y.o. male presenting with evaluation for neuropathy.    The patient has had balance difficulty for the last 4 years.  He describes a feeling like he is drunk when he walks.  He denies any shuffling gait. He notes that when he is in the shower, he needs to hold onto something.  He does note tingling in his feet.  He denies any tingling in his hands.  He denies any lower extremity weakness.  He denies any bladder incontinence.  He denies any neck pain or back pain.  He denies any double vision, loss of peripheral vision, slurred speech, or facial droop. Of note, he is on Gabapentin 300 mg TID - it has improved his tingling.        Past Medical History  Past Medical History:   Diagnosis Date    Diabetes mellitus (Multi)     Hyperlipidemia     Hypertension     Personal history of other diseases of the circulatory system 04/14/2022    History of uncontrolled hypertension    Personal history of other diseases of the circulatory system 04/14/2022    History of uncontrolled hypertension       Surgical History  Past Surgical History:   Procedure Laterality Date    CLAVICLE SURGERY Left 2004    KNEE Right 2004    meniscus cleaned       Family History  Family History   Problem Relation Name Age of Onset    Heart attack Mother      No Known Problems Father          Social History   reports that he has never smoked. He has never used smokeless tobacco. He reports current alcohol use. He reports that he does not use drugs.     Allergies  Empagliflozin    Medications    Current Outpatient Medications:     amLODIPine-benazepriL (Lotrel) 10-40 mg capsule, Take 1 capsule by mouth once daily., Disp: 90 capsule, Rfl: 1    chlorthalidone (Hygroton) 25 mg tablet, Take 1 tablet (25 mg) by mouth once daily., Disp: 90 tablet, Rfl: 3    cyanocobalamin (Vitamin B-12) 500 mcg tablet, Take 1 tablet (500 mcg) by mouth once  daily., Disp: , Rfl:     dapagliflozin propanediol (Farxiga) 5 mg, Take 1 tablet (5 mg) by mouth once daily., Disp: 90 tablet, Rfl: 1    ergocalciferol (Vitamin D-2) 1.25 MG (10588 UT) capsule, Take 1 capsule (50,000 Units) by mouth every 14 (fourteen) days., Disp: 6 capsule, Rfl: 2    flash glucose sensor kit kit, Use as instructed, Disp: 2 each, Rfl: 0    gabapentin (Neurontin) 300 mg capsule, Take 1 capsule (300 mg) by mouth 3 times a day., Disp: 270 capsule, Rfl: 0    rosuvastatin (Crestor) 5 mg tablet, Take 1 tablet (5 mg) by mouth once daily., Disp: 90 tablet, Rfl: 1    sildenafil (Viagra) 100 mg tablet, Take 1 tablet (100 mg) by mouth if needed for erectile dysfunction., Disp: 10 tablet, Rfl: 2    aspirin 81 mg chewable tablet, Chew 1 tablet (81 mg) once daily. (Patient not taking: Reported on 1/22/2025), Disp: 90 tablet, Rfl: 3    carvedilol (Coreg) 6.25 mg tablet, Take 1 tablet (6.25 mg) by mouth 2 times daily (morning and late afternoon). (Patient not taking: Reported on 1/22/2025), Disp: 180 tablet, Rfl: 1      Last Recorded Vitals   Blood pressure 174/90, pulse 75, temperature 36.3 °C (97.3 °F), temperature source Temporal, height 1.829 m (6'), weight 107 kg (236 lb 9.6 oz).    Objective:  Gen: NAD  Neuro:  --HIF: A&O X 3, repetition and naming intact  --CN:  PERRLA, EOMI, VFF, no visible facial asymmetry, facial sensation intact, no tongue or palatal deviation, SCM intact  --Motor: Moves all 4 extremities equally; no focal deficits except the following:   BLE:  DF 4, Inversion 4, Eversion 4  --Sensory: Vibration is absent in the toes and the ankles  --Reflex: 1+ in UE, absent in LE, toes down  --Cerebellum: FTN and HTS intact  --Gait: Mild steppage gait    Relevant Results  Lab Results   Component Value Date    WBC 7.9 10/10/2024    HGB 10.6 (L) 10/10/2024    HCT 32.8 (L) 10/10/2024    MCV 90 10/10/2024     10/10/2024       Lab Results   Component Value Date    GLUCOSE 162 (H) 10/10/2024     "CALCIUM 8.7 10/10/2024     10/10/2024    K 4.7 10/10/2024    CO2 24 10/10/2024     (H) 10/10/2024    BUN 40 (H) 10/10/2024    CREATININE 1.99 (H) 10/10/2024       Lab Results   Component Value Date    HGBA1C 6.9 (H) 10/10/2024       Lab Results   Component Value Date    CHOL 127 10/10/2024    CHOL 111 07/01/2024    CHOL 147 09/08/2023     Lab Results   Component Value Date    HDL 45.2 10/10/2024    HDL 44.1 07/01/2024    HDL 37.7 (A) 09/08/2023     Lab Results   Component Value Date    LDLCALC 58 10/10/2024    LDLCALC 54 07/01/2024     Lab Results   Component Value Date    TRIG 120 10/10/2024    TRIG 67 07/01/2024    TRIG 148 09/08/2023     No components found for: \"CHOLHDL\"    Vitamin B12: 541  TSH: 1.39     Assessment:   Polyneuropathy - patient states that he developed tingling in his feet and balance problems over the last 4 years  - likely secondary to diabetes  - exam notable distal symmetric sensory loss and weakness as described above  - labs: B6, SHEYLA, SPEP with STEPHEN, UPEP with STEPHEN, MAG Ab  - EMG/NCV  - offered PT for balance training; patient declines  - continue Gabapentin 300 mg TID      Linwood Houser MD  [facility]  Department of Neurology      A copy of this note was sent to the referring provider.    "

## 2025-02-12 ENCOUNTER — OFFICE VISIT (OUTPATIENT)
Dept: PRIMARY CARE | Facility: CLINIC | Age: 70
End: 2025-02-12
Payer: MEDICARE

## 2025-02-12 VITALS — HEIGHT: 72 IN | WEIGHT: 230 LBS | BODY MASS INDEX: 31.15 KG/M2

## 2025-02-12 DIAGNOSIS — J01.10 ACUTE NON-RECURRENT FRONTAL SINUSITIS: Primary | ICD-10-CM

## 2025-02-12 DIAGNOSIS — I10 PRIMARY HYPERTENSION: ICD-10-CM

## 2025-02-12 DIAGNOSIS — R51.9 RIGHT FACIAL PAIN: ICD-10-CM

## 2025-02-12 PROCEDURE — 1036F TOBACCO NON-USER: CPT | Performed by: INTERNAL MEDICINE

## 2025-02-12 PROCEDURE — 99213 OFFICE O/P EST LOW 20 MIN: CPT | Performed by: INTERNAL MEDICINE

## 2025-02-12 PROCEDURE — 3008F BODY MASS INDEX DOCD: CPT | Performed by: INTERNAL MEDICINE

## 2025-02-12 PROCEDURE — 1160F RVW MEDS BY RX/DR IN RCRD: CPT | Performed by: INTERNAL MEDICINE

## 2025-02-12 PROCEDURE — 1159F MED LIST DOCD IN RCRD: CPT | Performed by: INTERNAL MEDICINE

## 2025-02-12 PROCEDURE — 1123F ACP DISCUSS/DSCN MKR DOCD: CPT | Performed by: INTERNAL MEDICINE

## 2025-02-12 RX ORDER — FLUTICASONE PROPIONATE 50 MCG
1 SPRAY, SUSPENSION (ML) NASAL 2 TIMES DAILY
Qty: 16 G | Refills: 1 | Status: SHIPPED | OUTPATIENT
Start: 2025-02-12 | End: 2026-02-12

## 2025-02-12 RX ORDER — AMOXICILLIN AND CLAVULANATE POTASSIUM 875; 125 MG/1; MG/1
875 TABLET, FILM COATED ORAL 2 TIMES DAILY
Qty: 20 TABLET | Refills: 0 | Status: SHIPPED | OUTPATIENT
Start: 2025-02-12 | End: 2025-02-22

## 2025-02-12 ASSESSMENT — LIFESTYLE VARIABLES
SKIP TO QUESTIONS 9-10: 1
HOW OFTEN DO YOU HAVE A DRINK CONTAINING ALCOHOL: MONTHLY OR LESS
AUDIT-C TOTAL SCORE: 1
HAS A RELATIVE, FRIEND, DOCTOR, OR ANOTHER HEALTH PROFESSIONAL EXPRESSED CONCERN ABOUT YOUR DRINKING OR SUGGESTED YOU CUT DOWN: NO
HAVE YOU OR SOMEONE ELSE BEEN INJURED AS A RESULT OF YOUR DRINKING: NO
HOW OFTEN DO YOU HAVE SIX OR MORE DRINKS ON ONE OCCASION: NEVER
AUDIT TOTAL SCORE: 1
HOW MANY STANDARD DRINKS CONTAINING ALCOHOL DO YOU HAVE ON A TYPICAL DAY: 1 OR 2

## 2025-02-12 ASSESSMENT — PATIENT HEALTH QUESTIONNAIRE - PHQ9
SUM OF ALL RESPONSES TO PHQ9 QUESTIONS 1 AND 2: 0
1. LITTLE INTEREST OR PLEASURE IN DOING THINGS: NOT AT ALL
2. FEELING DOWN, DEPRESSED OR HOPELESS: NOT AT ALL

## 2025-02-12 NOTE — PROGRESS NOTES
Assessment/Plan     69 years old male who has history of hypertension did not want to have his blood pressure taken in the office today because he says his blood pressure is running normal at home was seen in the office for right facial discomfort mostly in the evening hours till he goes to his sleep.  There is no acute changes identified.  But after detailed discussion it was decided that he will be treated for possible right frontal sinusitis.  Side effects of the medications were reviewed and discussed.  If his symptoms does not get better with this treatment after about a week or more he should be reevaluated and he may need imaging studies.  This was discussed in detail with the patient and he says he will follow-up with the PCP.  He will continue to monitor his blood pressure at home and he will continue the medications for his blood pressure as prescribed.    Problem List Items Addressed This Visit       Primary hypertension    Right facial pain    Relevant Medications    amoxicillin-pot clavulanate (Augmentin) 875-125 mg tablet    fluticasone (Flonase) 50 mcg/actuation nasal spray    Acute non-recurrent frontal sinusitis - Primary    Relevant Medications    amoxicillin-pot clavulanate (Augmentin) 875-125 mg tablet    fluticasone (Flonase) 50 mcg/actuation nasal spray       Subjective     Patient ID: David Ortiz is a 69 y.o. male who presents for at night facial pressure.    History of present illness  69 years old male who has chronic medical problems including hypertension came for an acute visit.  Patient is having pain in his right side of the face for last 1 week.  He says the pain is mainly in the evenings when he is sitting in his chair but it is not related to any event such as meal or any other activities.  He denies any trauma.  He says the pain is significant and he took some sinus medicine which improved the pain till he goes to bed and when he wakes up in the morning his pain is not there.   Patient went to see the dentist and he says he was examined and has had x-rays done to exclude tooth problem.  He was told by the dentist it is a sinus problem and he should see the primary care physician.    He is a non-smoker.    Patient says that his blood pressure is running normal at home and he does not wanted to be checked in the office because every time he comes into the office his blood pressure is elevated.    He is continuing his medications which are reviewed including gabapentin and his antihypertensive medications.        Family History   Problem Relation Name Age of Onset    Heart attack Mother      No Known Problems Father        Social History     Socioeconomic History    Marital status:      Spouse name: Not on file    Number of children: Not on file    Years of education: Not on file    Highest education level: Not on file   Occupational History    Occupation: [employer]   Tobacco Use    Smoking status: Never    Smokeless tobacco: Never   Vaping Use    Vaping status: Never Used   Substance and Sexual Activity    Alcohol use: Yes     Comment: a few servings 1-2 times a week    Drug use: Never    Sexual activity: Not on file   Other Topics Concern    Not on file   Social History Narrative    Not on file     Social Drivers of Health     Financial Resource Strain: Not on file   Food Insecurity: Not on file   Transportation Needs: Not on file   Physical Activity: Not on file   Stress: Not on file   Social Connections: Not on file   Intimate Partner Violence: Not on file   Housing Stability: Not on file      Empagliflozin   Current Outpatient Medications   Medication Sig Dispense Refill    amLODIPine-benazepriL (Lotrel) 10-40 mg capsule Take 1 capsule by mouth once daily. 90 capsule 1    aspirin 81 mg chewable tablet Chew 1 tablet (81 mg) once daily. 90 tablet 3    carvedilol (Coreg) 6.25 mg tablet Take 1 tablet (6.25 mg) by mouth 2 times daily (morning and late afternoon). 180 tablet 1     chlorthalidone (Hygroton) 25 mg tablet Take 1 tablet (25 mg) by mouth once daily. 90 tablet 3    cyanocobalamin (Vitamin B-12) 500 mcg tablet Take 1 tablet (500 mcg) by mouth once daily.      dapagliflozin propanediol (Farxiga) 5 mg Take 1 tablet (5 mg) by mouth once daily. 90 tablet 1    ergocalciferol (Vitamin D-2) 1.25 MG (83593 UT) capsule Take 1 capsule (50,000 Units) by mouth every 14 (fourteen) days. 6 capsule 2    flash glucose sensor kit kit Use as instructed 2 each 0    gabapentin (Neurontin) 300 mg capsule Take 1 capsule (300 mg) by mouth 3 times a day. 270 capsule 0    rosuvastatin (Crestor) 5 mg tablet Take 1 tablet (5 mg) by mouth once daily. 90 tablet 1    sildenafil (Viagra) 100 mg tablet Take 1 tablet (100 mg) by mouth if needed for erectile dysfunction. 10 tablet 2    amoxicillin-pot clavulanate (Augmentin) 875-125 mg tablet Take 1 tablet (875 mg) by mouth 2 times a day for 10 days. 20 tablet 0    fluticasone (Flonase) 50 mcg/actuation nasal spray Administer 1 spray into each nostril 2 times a day. Shake gently. Before first use, prime pump. After use, clean tip and replace cap. 16 g 1     No current facility-administered medications for this visit.          Objective     Vitals:        Physical Exam  Alert, oriented x3, not in distress.  Has big mustache and beard.  Not pale, no cyanosis, no icterus. No skin rash  Neck:  Supple.  No JVD. No thyromegaly, trachea in the midline.  Has no facial swelling.  No tenderness on the sinuses.  Patient has right nasal congestion but no definite polyp noted.  Mouth is normal but he has a carious tooth in the right upper molar area but it is nontender.  No swelling around.  Throat is not inflamed and no tonsillar exudates. No lymphadenopathy.  No clubbing, peripheral osteoarthritis noted  Respiratory System:  Vesicular breathing moderate air entry and breath sounds.  No wheezing and no rales. No pleural rub.  Cardiovascular:  S1 and S2 positive.  No murmur.   Regular rate and rhythm.  Abdomen:  Soft.  Bowel sounds positive.  Liver and spleen not palpable.  Extremities:  Peripheral pulses present.  No edema. Gait is normal    Problem List Items Addressed This Visit       Primary hypertension    Right facial pain    Relevant Medications    amoxicillin-pot clavulanate (Augmentin) 875-125 mg tablet    fluticasone (Flonase) 50 mcg/actuation nasal spray    Acute non-recurrent frontal sinusitis - Primary    Relevant Medications    amoxicillin-pot clavulanate (Augmentin) 875-125 mg tablet    fluticasone (Flonase) 50 mcg/actuation nasal spray        No orders of the defined types were placed in this encounter.       Lab Results   Component Value Date    WBC 7.9 10/10/2024    HGB 10.6 (L) 10/10/2024    HCT 32.8 (L) 10/10/2024     10/10/2024    CHOL 127 10/10/2024    TRIG 120 10/10/2024    HDL 45.2 10/10/2024    ALT 12 10/10/2024    AST 11 10/10/2024     10/10/2024    K 4.7 10/10/2024     (H) 10/10/2024    CREATININE 1.99 (H) 10/10/2024    BUN 40 (H) 10/10/2024    CO2 24 10/10/2024    TSH 1.39 10/10/2024    HGBA1C 6.9 (H) 10/10/2024     Lab Results   Component Value Date    CHOL 127 10/10/2024    LDLCALC 58 10/10/2024    CHHDL 2.8 10/10/2024       No results found.

## 2025-02-13 ENCOUNTER — APPOINTMENT (OUTPATIENT)
Dept: PRIMARY CARE | Facility: CLINIC | Age: 70
End: 2025-02-13
Payer: MEDICARE

## 2025-02-25 DIAGNOSIS — E78.5 HYPERLIPIDEMIA, UNSPECIFIED HYPERLIPIDEMIA TYPE: ICD-10-CM

## 2025-02-25 RX ORDER — ROSUVASTATIN CALCIUM 5 MG/1
5 TABLET, COATED ORAL DAILY
Qty: 90 TABLET | Refills: 1 | Status: SHIPPED | OUTPATIENT
Start: 2025-02-25

## 2025-03-18 DIAGNOSIS — I10 PRIMARY HYPERTENSION: ICD-10-CM

## 2025-03-20 RX ORDER — AMLODIPINE AND BENAZEPRIL HYDROCHLORIDE 10; 40 MG/1; MG/1
1 CAPSULE ORAL DAILY
Qty: 90 CAPSULE | Refills: 0 | Status: SHIPPED | OUTPATIENT
Start: 2025-03-20

## 2025-03-25 ENCOUNTER — APPOINTMENT (OUTPATIENT)
Dept: PRIMARY CARE | Facility: CLINIC | Age: 70
End: 2025-03-25
Payer: MEDICARE

## 2025-03-25 VITALS
HEIGHT: 72 IN | SYSTOLIC BLOOD PRESSURE: 130 MMHG | WEIGHT: 238 LBS | TEMPERATURE: 97.3 F | DIASTOLIC BLOOD PRESSURE: 84 MMHG | BODY MASS INDEX: 32.23 KG/M2 | HEART RATE: 65 BPM

## 2025-03-25 DIAGNOSIS — E66.09 CLASS 1 OBESITY DUE TO EXCESS CALORIES WITH SERIOUS COMORBIDITY AND BODY MASS INDEX (BMI) OF 32.0 TO 32.9 IN ADULT: ICD-10-CM

## 2025-03-25 DIAGNOSIS — E11.69 HYPERLIPIDEMIA ASSOCIATED WITH TYPE 2 DIABETES MELLITUS: ICD-10-CM

## 2025-03-25 DIAGNOSIS — E78.5 HYPERLIPIDEMIA ASSOCIATED WITH TYPE 2 DIABETES MELLITUS: ICD-10-CM

## 2025-03-25 DIAGNOSIS — E11.40 CONTROLLED TYPE 2 DIABETES WITH NEUROPATHY: Primary | ICD-10-CM

## 2025-03-25 DIAGNOSIS — E66.811 CLASS 1 OBESITY DUE TO EXCESS CALORIES WITH SERIOUS COMORBIDITY AND BODY MASS INDEX (BMI) OF 32.0 TO 32.9 IN ADULT: ICD-10-CM

## 2025-03-25 DIAGNOSIS — I10 PRIMARY HYPERTENSION: ICD-10-CM

## 2025-03-25 DIAGNOSIS — D64.9 ANEMIA, UNSPECIFIED TYPE: ICD-10-CM

## 2025-03-25 DIAGNOSIS — N18.32 STAGE 3B CHRONIC KIDNEY DISEASE (MULTI): ICD-10-CM

## 2025-03-25 PROCEDURE — 1159F MED LIST DOCD IN RCRD: CPT | Performed by: FAMILY MEDICINE

## 2025-03-25 PROCEDURE — 1158F ADVNC CARE PLAN TLK DOCD: CPT | Performed by: FAMILY MEDICINE

## 2025-03-25 PROCEDURE — 99214 OFFICE O/P EST MOD 30 MIN: CPT | Performed by: FAMILY MEDICINE

## 2025-03-25 PROCEDURE — 1160F RVW MEDS BY RX/DR IN RCRD: CPT | Performed by: FAMILY MEDICINE

## 2025-03-25 PROCEDURE — 1036F TOBACCO NON-USER: CPT | Performed by: FAMILY MEDICINE

## 2025-03-25 PROCEDURE — 3075F SYST BP GE 130 - 139MM HG: CPT | Performed by: FAMILY MEDICINE

## 2025-03-25 PROCEDURE — 3078F DIAST BP <80 MM HG: CPT | Performed by: FAMILY MEDICINE

## 2025-03-25 PROCEDURE — G2211 COMPLEX E/M VISIT ADD ON: HCPCS | Performed by: FAMILY MEDICINE

## 2025-03-25 PROCEDURE — 3008F BODY MASS INDEX DOCD: CPT | Performed by: FAMILY MEDICINE

## 2025-03-25 PROCEDURE — 1123F ACP DISCUSS/DSCN MKR DOCD: CPT | Performed by: FAMILY MEDICINE

## 2025-03-25 RX ORDER — GABAPENTIN 300 MG/1
300 CAPSULE ORAL 3 TIMES DAILY
Qty: 270 CAPSULE | Refills: 1 | Status: SHIPPED | OUTPATIENT
Start: 2025-03-25 | End: 2025-09-21

## 2025-03-25 NOTE — ASSESSMENT & PLAN NOTE
Last blood work showed anemia.  Again recommend patient see GI.  Plan to recheck blood work as ordered.

## 2025-03-25 NOTE — PROGRESS NOTES
Subjective   Patient ID: David Ortiz is a 69 y.o. male who presents for Follow-up (Patient present to follow up. ).  Patient presents today for follow-up on a recent sinus infection.  He was having tooth pain and saw his dentist who did not see any dental issues.  He states that the symptoms have now resolved.  He saw neurology and was diagnosed with diabetic neuropathy.  He feels that the gabapentin has helped significantly with the pain and he would like to continue with this.  He states that otherwise he has been doing okay.  He denies any chest pain or shortness of breath or abdominal pain.  He denies any other concerns.  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   Tobacco Use    Smoking status: Never    Smokeless tobacco: Never   Vaping Use    Vaping status: Never Used   Substance and Sexual Activity    Alcohol use: Yes     Comment: a few servings 1-2 times a week    Drug use: Never    Sexual activity: Not on file   Other Topics Concern    Not on file   Social History Narrative    Not on file     Social Drivers of Health     Financial Resource Strain: Not on file   Food Insecurity: Not on file   Transportation Needs: Not on file   Physical Activity: Not on file   Stress: Not on file   Social Connections: Not on file   Intimate Partner Violence: Not on file   Housing Stability: Not on file     Current Outpatient Medications   Medication Sig Dispense Refill    amLODIPine-benazepriL (Lotrel) 10-40 mg capsule Take 1 capsule by mouth once daily 90 capsule 0    aspirin 81 mg chewable tablet Chew 1 tablet (81 mg) once daily. 90 tablet 3    carvedilol (Coreg) 6.25 mg tablet Take 1 tablet (6.25 mg) by mouth 2 times daily (morning and late afternoon). 180 tablet 1    chlorthalidone (Hygroton) 25 mg  tablet Take 1 tablet (25 mg) by mouth once daily. 90 tablet 3    cyanocobalamin (Vitamin B-12) 500 mcg tablet Take 1 tablet (500 mcg) by mouth once daily.      dapagliflozin propanediol (Farxiga) 5 mg Take 1 tablet (5 mg) by mouth once daily. 90 tablet 1    ergocalciferol (Vitamin D-2) 1.25 MG (14057 UT) capsule Take 1 capsule (50,000 Units) by mouth every 14 (fourteen) days. 6 capsule 2    flash glucose sensor kit kit Use as instructed 2 each 0    rosuvastatin (Crestor) 5 mg tablet Take 1 tablet by mouth once daily 90 tablet 1    sildenafil (Viagra) 100 mg tablet Take 1 tablet (100 mg) by mouth if needed for erectile dysfunction. 10 tablet 2    gabapentin (Neurontin) 300 mg capsule Take 1 capsule (300 mg) by mouth 3 times a day. 270 capsule 1     No current facility-administered medications for this visit.     Family History   Problem Relation Name Age of Onset    Heart attack Mother      No Known Problems Father       Review of Systems  Immunization History   Administered Date(s) Administered    Influenza Whole 12/20/2006    Influenza, Unspecified 11/20/2002    Td vaccine, age 7 years and older (TDVAX) 01/01/2000       Review of Systems negative except as noted in HPI and Chief complaint.     Objective                 /84   Pulse 65   Temp 36.3 °C (97.3 °F)   Ht 1.829 m (6')   Wt 108 kg (238 lb)   BMI 32.28 kg/m²    Physical Exam  Vitals reviewed.   HENT:      Head: Normocephalic and atraumatic.      Right Ear: Tympanic membrane normal.      Left Ear: Tympanic membrane normal.      Nose: Nose normal.      Mouth/Throat:      Pharynx: Oropharynx is clear.   Eyes:      Extraocular Movements: Extraocular movements intact.      Conjunctiva/sclera: Conjunctivae normal.      Pupils: Pupils are equal, round, and reactive to light.   Cardiovascular:      Rate and Rhythm: Normal rate and regular rhythm.      Pulses: Normal pulses.   Pulmonary:      Effort: Pulmonary effort is normal.      Breath sounds: Normal  "breath sounds.   Abdominal:      General: There is no distension.      Palpations: Abdomen is soft.      Tenderness: There is no abdominal tenderness.   Musculoskeletal:         General: Normal range of motion.      Cervical back: Normal range of motion and neck supple.      Right lower leg: No edema.      Left lower leg: No edema.   Lymphadenopathy:      Cervical: No cervical adenopathy.   Neurological:      General: No focal deficit present.      Mental Status: He is alert.       No results found for this or any previous visit (from the past 96 hours).  Lab Results   Component Value Date    WBC 7.9 10/10/2024    HGB 10.6 (L) 10/10/2024    HCT 32.8 (L) 10/10/2024    MCV 90 10/10/2024     10/10/2024     Lab Results   Component Value Date    GLUCOSE 162 (H) 10/10/2024    CALCIUM 8.7 10/10/2024     10/10/2024    K 4.7 10/10/2024    CO2 24 10/10/2024     (H) 10/10/2024    BUN 40 (H) 10/10/2024    CREATININE 1.99 (H) 10/10/2024     Lab Results   Component Value Date    CHOL 127 10/10/2024    CHOL 111 07/01/2024    CHOL 147 09/08/2023     Lab Results   Component Value Date    HDL 45.2 10/10/2024    HDL 44.1 07/01/2024    HDL 37.7 (A) 09/08/2023     Lab Results   Component Value Date    LDLCALC 58 10/10/2024    LDLCALC 54 07/01/2024     Lab Results   Component Value Date    TRIG 120 10/10/2024    TRIG 67 07/01/2024    TRIG 148 09/08/2023     No components found for: \"CHOLHDL\"   Lab Results   Component Value Date    TSH 1.39 10/10/2024      Lab Results   Component Value Date    HGBA1C 6.9 (H) 10/10/2024      Lab Results   Component Value Date    ALBUMINUR 111.7 10/10/2024      Assessment/Plan   Problem List Items Addressed This Visit       Controlled type 2 diabetes with neuropathy - Primary     Diabetes has been controlled.  Continue with diabetic diet and exercise.  See ophthalmology.  Check feet.  Continue with current medications.         Relevant Medications    gabapentin (Neurontin) 300 mg capsule "    Other Relevant Orders    Albumin-Creatinine Ratio, Urine Random    Hemoglobin A1C    Lipid Panel    Comprehensive Metabolic Panel    CBC and Auto Differential    Primary hypertension     Blood pressure controlled.  Continue with amlodipine/benazepril, carvedilol and chlorthalidone.  Check blood work as ordered.         Stage 3b chronic kidney disease (Multi)     Continue follow-up with nephrology.  Repeat blood work.         Hyperlipidemia associated with type 2 diabetes mellitus (Multi)     Cholesterol well-controlled.  Continue with statin.         Class 1 obesity due to excess calories with serious comorbidity and body mass index (BMI) of 32.0 to 32.9 in adult    Anemia     Last blood work showed anemia.  Again recommend patient see GI.  Plan to recheck blood work as ordered.         Relevant Orders    Folate    Vitamin B12    Iron and TIBC    Ferritin

## 2025-03-25 NOTE — ASSESSMENT & PLAN NOTE
Blood pressure controlled.  Continue with amlodipine/benazepril, carvedilol and chlorthalidone.  Check blood work as ordered.

## 2025-03-25 NOTE — ASSESSMENT & PLAN NOTE
Diabetes has been controlled.  Continue with diabetic diet and exercise.  See ophthalmology.  Check feet.  Continue with current medications.

## 2025-03-26 ENCOUNTER — APPOINTMENT (OUTPATIENT)
Dept: NEPHROLOGY | Facility: CLINIC | Age: 70
End: 2025-03-26
Payer: MEDICARE

## 2025-04-11 DIAGNOSIS — E11.69 TYPE 2 DIABETES MELLITUS WITH OTHER SPECIFIED COMPLICATION, WITHOUT LONG-TERM CURRENT USE OF INSULIN: ICD-10-CM

## 2025-04-11 DIAGNOSIS — E11.319 DIABETIC RETINOPATHY ASSOCIATED WITH TYPE 2 DIABETES MELLITUS, MACULAR EDEMA PRESENCE UNSPECIFIED, UNSPECIFIED LATERALITY, UNSPECIFIED RETINOPATHY SEVERITY: ICD-10-CM

## 2025-04-12 LAB
ALBUMIN SERPL-MCNC: 4.5 G/DL (ref 3.6–5.1)
ALBUMIN/CREAT UR: 187 MG/G CREAT
ALP SERPL-CCNC: 46 U/L (ref 35–144)
ALT SERPL-CCNC: 12 U/L (ref 9–46)
ANION GAP SERPL CALCULATED.4IONS-SCNC: 9 MMOL/L (CALC) (ref 7–17)
AST SERPL-CCNC: 12 U/L (ref 10–35)
BASOPHILS # BLD AUTO: 56 CELLS/UL (ref 0–200)
BASOPHILS NFR BLD AUTO: 0.6 %
BILIRUB SERPL-MCNC: 0.5 MG/DL (ref 0.2–1.2)
BUN SERPL-MCNC: 39 MG/DL (ref 7–25)
CALCIUM SERPL-MCNC: 9.1 MG/DL (ref 8.6–10.3)
CHLORIDE SERPL-SCNC: 105 MMOL/L (ref 98–110)
CHOLEST SERPL-MCNC: 138 MG/DL
CHOLEST/HDLC SERPL: 3.1 (CALC)
CO2 SERPL-SCNC: 24 MMOL/L (ref 20–32)
CREAT SERPL-MCNC: 1.86 MG/DL (ref 0.7–1.28)
CREAT UR-MCNC: 53 MG/DL (ref 20–320)
EGFRCR SERPLBLD CKD-EPI 2021: 38 ML/MIN/1.73M2
EOSINOPHIL # BLD AUTO: 270 CELLS/UL (ref 15–500)
EOSINOPHIL NFR BLD AUTO: 2.9 %
ERYTHROCYTE [DISTWIDTH] IN BLOOD BY AUTOMATED COUNT: 13.4 % (ref 11–15)
EST. AVERAGE GLUCOSE BLD GHB EST-MCNC: 192 MG/DL
EST. AVERAGE GLUCOSE BLD GHB EST-SCNC: 10.6 MMOL/L
FERRITIN SERPL-MCNC: 156 NG/ML (ref 24–380)
FOLATE SERPL-MCNC: 14 NG/ML
GLUCOSE SERPL-MCNC: 199 MG/DL (ref 65–99)
HBA1C MFR BLD: 8.3 % OF TOTAL HGB
HCT VFR BLD AUTO: 37.5 % (ref 38.5–50)
HDLC SERPL-MCNC: 45 MG/DL
HGB BLD-MCNC: 12 G/DL (ref 13.2–17.1)
IRON SATN MFR SERPL: 28 % (CALC) (ref 20–48)
IRON SERPL-MCNC: 79 MCG/DL (ref 50–180)
LDLC SERPL CALC-MCNC: 69 MG/DL (CALC)
LYMPHOCYTES # BLD AUTO: 2641 CELLS/UL (ref 850–3900)
LYMPHOCYTES NFR BLD AUTO: 28.4 %
MCH RBC QN AUTO: 28.5 PG (ref 27–33)
MCHC RBC AUTO-ENTMCNC: 32 G/DL (ref 32–36)
MCV RBC AUTO: 89.1 FL (ref 80–100)
MICROALBUMIN UR-MCNC: 9.9 MG/DL
MONOCYTES # BLD AUTO: 586 CELLS/UL (ref 200–950)
MONOCYTES NFR BLD AUTO: 6.3 %
NEUTROPHILS # BLD AUTO: 5747 CELLS/UL (ref 1500–7800)
NEUTROPHILS NFR BLD AUTO: 61.8 %
NONHDLC SERPL-MCNC: 93 MG/DL (CALC)
PLATELET # BLD AUTO: 290 THOUSAND/UL (ref 140–400)
PMV BLD REES-ECKER: 9.6 FL (ref 7.5–12.5)
POTASSIUM SERPL-SCNC: 5.3 MMOL/L (ref 3.5–5.3)
PROT SERPL-MCNC: 7.4 G/DL (ref 6.1–8.1)
RBC # BLD AUTO: 4.21 MILLION/UL (ref 4.2–5.8)
SODIUM SERPL-SCNC: 138 MMOL/L (ref 135–146)
TIBC SERPL-MCNC: 279 MCG/DL (CALC) (ref 250–425)
TRIGL SERPL-MCNC: 154 MG/DL
VIT B12 SERPL-MCNC: >2000 PG/ML (ref 200–1100)
WBC # BLD AUTO: 9.3 THOUSAND/UL (ref 3.8–10.8)

## 2025-04-14 PROBLEM — R93.89 ABNORMAL CT OF THE CHEST: Status: ACTIVE | Noted: 2025-04-14

## 2025-04-14 PROBLEM — E11.65 UNCONTROLLED TYPE 2 DIABETES MELLITUS WITH HYPERGLYCEMIA: Status: ACTIVE | Noted: 2025-04-14

## 2025-04-14 PROBLEM — R91.8 GROUND GLASS OPACITY PRESENT ON IMAGING OF LUNG: Status: ACTIVE | Noted: 2025-04-14

## 2025-04-14 RX ORDER — FLASH GLUCOSE SENSOR
KIT MISCELLANEOUS
Qty: 6 EACH | Refills: 1 | Status: SHIPPED | OUTPATIENT
Start: 2025-04-14

## 2025-04-17 ENCOUNTER — APPOINTMENT (OUTPATIENT)
Dept: PRIMARY CARE | Facility: CLINIC | Age: 70
End: 2025-04-17
Payer: MEDICARE

## 2025-04-17 VITALS
WEIGHT: 232.4 LBS | TEMPERATURE: 97.3 F | SYSTOLIC BLOOD PRESSURE: 124 MMHG | HEIGHT: 72 IN | DIASTOLIC BLOOD PRESSURE: 70 MMHG | BODY MASS INDEX: 31.48 KG/M2 | HEART RATE: 70 BPM

## 2025-04-17 DIAGNOSIS — R91.8 GROUND GLASS OPACITY PRESENT ON IMAGING OF LUNG: ICD-10-CM

## 2025-04-17 DIAGNOSIS — E11.69 HYPERLIPIDEMIA ASSOCIATED WITH TYPE 2 DIABETES MELLITUS: ICD-10-CM

## 2025-04-17 DIAGNOSIS — E66.09 CLASS 1 OBESITY DUE TO EXCESS CALORIES WITH SERIOUS COMORBIDITY AND BODY MASS INDEX (BMI) OF 31.0 TO 31.9 IN ADULT: ICD-10-CM

## 2025-04-17 DIAGNOSIS — Z00.00 HEALTHCARE MAINTENANCE: Primary | ICD-10-CM

## 2025-04-17 DIAGNOSIS — R93.89 ABNORMAL CT OF THE CHEST: ICD-10-CM

## 2025-04-17 DIAGNOSIS — N18.32 STAGE 3B CHRONIC KIDNEY DISEASE (MULTI): ICD-10-CM

## 2025-04-17 DIAGNOSIS — E78.5 HYPERLIPIDEMIA ASSOCIATED WITH TYPE 2 DIABETES MELLITUS: ICD-10-CM

## 2025-04-17 DIAGNOSIS — J30.9 ALLERGIC RHINITIS, UNSPECIFIED SEASONALITY, UNSPECIFIED TRIGGER: ICD-10-CM

## 2025-04-17 DIAGNOSIS — E11.65 UNCONTROLLED TYPE 2 DIABETES MELLITUS WITH HYPERGLYCEMIA: ICD-10-CM

## 2025-04-17 DIAGNOSIS — E55.9 VITAMIN D DEFICIENCY: ICD-10-CM

## 2025-04-17 DIAGNOSIS — D64.9 ANEMIA, UNSPECIFIED TYPE: ICD-10-CM

## 2025-04-17 DIAGNOSIS — I10 PRIMARY HYPERTENSION: ICD-10-CM

## 2025-04-17 DIAGNOSIS — E66.811 CLASS 1 OBESITY DUE TO EXCESS CALORIES WITH SERIOUS COMORBIDITY AND BODY MASS INDEX (BMI) OF 31.0 TO 31.9 IN ADULT: ICD-10-CM

## 2025-04-17 DIAGNOSIS — Z00.00 ROUTINE GENERAL MEDICAL EXAMINATION AT HEALTH CARE FACILITY: ICD-10-CM

## 2025-04-17 PROCEDURE — 1158F ADVNC CARE PLAN TLK DOCD: CPT | Performed by: FAMILY MEDICINE

## 2025-04-17 PROCEDURE — 99214 OFFICE O/P EST MOD 30 MIN: CPT | Performed by: FAMILY MEDICINE

## 2025-04-17 PROCEDURE — 1123F ACP DISCUSS/DSCN MKR DOCD: CPT | Performed by: FAMILY MEDICINE

## 2025-04-17 PROCEDURE — G0439 PPPS, SUBSEQ VISIT: HCPCS | Performed by: FAMILY MEDICINE

## 2025-04-17 PROCEDURE — 3008F BODY MASS INDEX DOCD: CPT | Performed by: FAMILY MEDICINE

## 2025-04-17 PROCEDURE — 3078F DIAST BP <80 MM HG: CPT | Performed by: FAMILY MEDICINE

## 2025-04-17 PROCEDURE — 1036F TOBACCO NON-USER: CPT | Performed by: FAMILY MEDICINE

## 2025-04-17 PROCEDURE — 1170F FXNL STATUS ASSESSED: CPT | Performed by: FAMILY MEDICINE

## 2025-04-17 PROCEDURE — 3074F SYST BP LT 130 MM HG: CPT | Performed by: FAMILY MEDICINE

## 2025-04-17 PROCEDURE — 99397 PER PM REEVAL EST PAT 65+ YR: CPT | Performed by: FAMILY MEDICINE

## 2025-04-17 PROCEDURE — G2211 COMPLEX E/M VISIT ADD ON: HCPCS | Performed by: FAMILY MEDICINE

## 2025-04-17 PROCEDURE — 1159F MED LIST DOCD IN RCRD: CPT | Performed by: FAMILY MEDICINE

## 2025-04-17 PROCEDURE — 1160F RVW MEDS BY RX/DR IN RCRD: CPT | Performed by: FAMILY MEDICINE

## 2025-04-17 RX ORDER — LORATADINE 10 MG/1
10 TABLET ORAL DAILY
Qty: 30 TABLET | Refills: 2 | Status: SHIPPED | OUTPATIENT
Start: 2025-04-17 | End: 2025-07-16

## 2025-04-17 RX ORDER — AMLODIPINE AND BENAZEPRIL HYDROCHLORIDE 10; 40 MG/1; MG/1
1 CAPSULE ORAL DAILY
Qty: 90 CAPSULE | Refills: 1 | Status: SHIPPED | OUTPATIENT
Start: 2025-04-17

## 2025-04-17 RX ORDER — ERGOCALCIFEROL 1.25 MG/1
1.25 CAPSULE ORAL
Qty: 6 CAPSULE | Refills: 2 | Status: SHIPPED | OUTPATIENT
Start: 2025-04-17

## 2025-04-17 RX ORDER — DAPAGLIFLOZIN 10 MG/1
10 TABLET, FILM COATED ORAL DAILY
Qty: 100 TABLET | Refills: 3 | Status: SHIPPED | OUTPATIENT
Start: 2025-04-17 | End: 2026-05-22

## 2025-04-17 ASSESSMENT — ACTIVITIES OF DAILY LIVING (ADL)
MANAGING_FINANCES: INDEPENDENT
DOING_HOUSEWORK: INDEPENDENT
DRESSING: INDEPENDENT
BATHING: INDEPENDENT
GROCERY_SHOPPING: INDEPENDENT
TAKING_MEDICATION: INDEPENDENT

## 2025-04-17 NOTE — ASSESSMENT & PLAN NOTE
Continue with vitamin D supplement.  Recheck vitamin D level.    Orders:    ergocalciferol (Vitamin D-2) 1250 mcg (50,000 units) capsule; Take 1 capsule (1.25 mg) by mouth every 14 (fourteen) days.    Vitamin D 25-Hydroxy,Total (for eval of Vitamin D levels); Future

## 2025-04-17 NOTE — ASSESSMENT & PLAN NOTE
Blood pressure well-controlled.  Continue with Coreg, amlodipine/benazepril and chlorthalidone.  Will plan to recheck at follow-up.    Orders:    Albumin-Creatinine Ratio, Urine Random; Future    amLODIPine-benazepriL (Lotrel) 10-40 mg capsule; Take 1 capsule by mouth once daily.     SW met with patient in pt room; roommate not present; pt denies current SI/HI/AH/VH, dep 5, anx 8; pt identified his stressors as financial, marital, relapse with ETOH abuse; pt focused on alcohol use and recovery but declined referral for inpt rehab services; pt discussed his feelings of worthlessness, lack of confidence, lack of motivation; pt not working since 2018 with no personal income and denials of disability claims; SW provided support as appropriate       05/17/21 1525   Patient Intake   Living Arrangement House;Lives with someone   Can patient return home? 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   Work History Unemployed   School Grade/Year 12th  (H/S & some college)   Admission Status   Status of Admission 201   Patient History   Treatment History hx outpatient and inpatient   Currently in Treatment Yes   Current Psychiatrist/Therapist STACIE Olivas   Current Treatment Appt Info next appt: 6/10 @ 3pm   Substance Abuse Yes (See [address] History section for detail)   Crisis Info   Release of Information Signed Yes  (psych, pcp, wife, cmp D&A)     Behavioral Health Psychosocial    Strengths: outpt provider, support system, stable housing   Limitations: hopeless/helpless; poor impulse control; chronic MH   Coping Skills: fishing/hunting   Hx Mental Illness: MDD recurrent  Past Hospitalizations?  Dates? "a few years ago"  Hx Psych Meds: wellbutrin, seroquel, zoloft  Current Med Compliance: admits  Hx Non Compliance: denies  Hx SA or SI in last 12 mons  : SA via O/D (this admission)  Access to Firearms: yes - hunting rifles - declined lethality use   Hx Violence to self or others past 12 mons  : denies  Hx HI past 12 mons  : denies  Hx abuse: denies  Current psychological trauma: denies  Family hx mental illness: denies  Family hx suicide/homicide: denies  Family hx substance abuse: denies  Family hx dementia: denies  Current Substance abuse:   Name of substance   how much how often last use     age of use      clean  ETOH - declined to elaborate on current use; states he "fell off wagon" Easter  and has only "drank 2 more days since then"  Smoking Cessation ? D&A Rehab referral?  Declined inpatient referral - accepts referral to [facility] D&A as he had services in past   Other Addictions? Past or Present? Hx ETOH use - 11 yrs longest sobriety  Hx Arrests, Probation or [place]? Current Legal Problems? denies  Marital Status/Relationship Hx?  - Meaghan Reusing - 20+ yrs - first and only   Sexual Preference? Heterosexual - no concerns   Children? Ages? Relationship? Andrewceasar [facility] (18), [facility] [employer] (16)  Are you currently responsible for any children? Pets? Parents? Relationships? Both    Are you a caregiver? Siblings? Relationships? 3 sister, 3 brothers  Any other family supports? Current living situation? Able to return? Lives at home with wife - can return   Caregivers for pt - any stressors? Education Hx? 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 none  Assistive Devices? Compliance? Oxygen, glasses, dental implants   Physical barriers in the home? (Steps, bathroom/bedroom location?) none  Pentecostalism preferences? Oriental orthodox   Would you like Jehovah's witness notified? no  Cultural needs? none  How do you get to your appointments? Drives self   Financial Resources:   none - wife assists    Ability to pay for meds: yes   Monthly Income: none  Medical Insurance: [place]   Power of ?  no  If no, Info?  yes  POA for Mental Health?  no  If no, Info? yes  Advanced directives?   no     If no, Info? yes  Guardian? none  Does someone provide financial assistance to you? wife  Current providers:   PCP? Dr Ross Bergeron - MILESS ok - Jalen Last?  Deidra Veronica - KATHERINE ok - MIGUELANGEL   Therapist? None - considering referral   Current Pharmacy [facility]   Family notification? wife - MIGUELANGEL   Family meeting? declined  Aftercare needs? D&A, psych, pcp - possible therapy referral   Discharge disposition?  Home   Partial Referral? Declined - "I don't like video meetings"

## 2025-04-17 NOTE — ASSESSMENT & PLAN NOTE
Diabetes is not controlled.  Hemoglobin A1c is 8.3.  Will increase Farxiga to 10 mg daily.  Patient also to work on diabetic diet and increase activity level.  Follow-up in 3 to 4 months and check blood work at that time.  See ophthalmology.  Check feet.    Orders:    Hemoglobin A1C; Future    Lipid Panel; Future    Comprehensive Metabolic Panel; Future    CBC and Auto Differential; Future    dapagliflozin propanediol (Farxiga) 10 mg tablet; Take 1 tablet (10 mg) by mouth once daily.

## 2025-04-17 NOTE — PROGRESS NOTES
Subjective   Reason for Visit: David Ortiz is an 69 y.o. male here for a Medicare Wellness visit.  Patient presents today for an annual wellness visit and follow-up on his chronic medical problems.  He reports that overall he has been feeling well.  States that he has been bored.  He was planning to become more active.  He is looking at buying a new motorcycle.  He states that is going to try to exercise more.  We did review that his hemoglobin A1c has worsened.  It is 8.3.  He has been taking the Farxiga without difficulty and is willing to increase this.  He reports occasional allergy symptoms and would like to know what he can take.  He denies any chest pain or shortness of breath or abdominal pain.  He denies any other concerns.    Past Medical, Surgical, and Family History reviewed and updated in chart.    Reviewed all medications by prescribing practitioner or clinical pharmacist (such as prescriptions, OTCs, herbal therapies and supplements) and documented in the medical record.    HPI    Patient Care Team:  Nena Jacobson MD as PCP - General (Family Medicine)  Fifi Chu MD as PCP - [employer]age PCP  Coy Cortez MD as Nephrologist (Nephrology)  Diamond Hagen MD as Cardiologist (Cardiology)     Review of Systems    Objective   Vitals:  /70   Pulse 70   Temp 36.3 °C (97.3 °F)   Ht 1.829 m (6')   Wt 105 kg (232 lb 6.4 oz)   BMI 31.52 kg/m²       Physical Exam  Vitals reviewed.   HENT:      Head: Normocephalic and atraumatic.      Right Ear: Tympanic membrane normal.      Left Ear: Tympanic membrane normal.      Nose: Nose normal.      Mouth/Throat:      Pharynx: Oropharynx is clear.   Eyes:      Extraocular Movements: Extraocular movements intact.      Conjunctiva/sclera: Conjunctivae normal.      Pupils: Pupils are equal, round, and reactive to light.   Cardiovascular:      Rate and Rhythm: Normal rate and regular rhythm.      Pulses: Normal pulses.   Pulmonary:       Effort: Pulmonary effort is normal.      Breath sounds: Normal breath sounds.   Abdominal:      General: There is no distension.      Palpations: Abdomen is soft.      Tenderness: There is no abdominal tenderness.   Musculoskeletal:         General: Normal range of motion.      Cervical back: Normal range of motion and neck supple.      Right lower leg: No edema.      Left lower leg: No edema.   Lymphadenopathy:      Cervical: No cervical adenopathy.   Neurological:      General: No focal deficit present.      Mental Status: He is alert.         Assessment & Plan  Healthcare maintenance  Continue with healthy diet and exercise.  Screening blood work up-to-date.  Recommend colonoscopy.  Also recommend Prevnar 20, shingles vaccine, Tdap and flu and COVID vaccines.         Uncontrolled type 2 diabetes mellitus with hyperglycemia  Diabetes is not controlled.  Hemoglobin A1c is 8.3.  Will increase Farxiga to 10 mg daily.  Patient also to work on diabetic diet and increase activity level.  Follow-up in 3 to 4 months and check blood work at that time.  See ophthalmology.  Check feet.    Orders:    Hemoglobin A1C; Future    Lipid Panel; Future    Comprehensive Metabolic Panel; Future    CBC and Auto Differential; Future    dapagliflozin propanediol (Farxiga) 10 mg tablet; Take 1 tablet (10 mg) by mouth once daily.    Primary hypertension  Blood pressure well-controlled.  Continue with Coreg, amlodipine/benazepril and chlorthalidone.  Will plan to recheck at follow-up.    Orders:    Albumin-Creatinine Ratio, Urine Random; Future    amLODIPine-benazepriL (Lotrel) 10-40 mg capsule; Take 1 capsule by mouth once daily.    Hyperlipidemia associated with type 2 diabetes mellitus  Cholesterol controlled.  Continue with rosuvastatin.         Ground glass opacity present on imaging of lung  This was noted in the left upper lobe on his coronary artery calcium score.  Plan to repeat check CT to confirm this has  resolved.    Orders:    CT chest wo IV contrast; Future    Abnormal CT of the chest  See above.         Vitamin D deficiency  Continue with vitamin D supplement.  Recheck vitamin D level.    Orders:    ergocalciferol (Vitamin D-2) 1250 mcg (50,000 units) capsule; Take 1 capsule (1.25 mg) by mouth every 14 (fourteen) days.    Vitamin D 25-Hydroxy,Total (for eval of Vitamin D levels); Future    Allergic rhinitis, unspecified seasonality, unspecified trigger  Prescription sent for loratadine to take as needed.    Orders:    loratadine (Claritin) 10 mg tablet; Take 1 tablet (10 mg) by mouth once daily.    Class 1 obesity due to excess calories with serious comorbidity and body mass index (BMI) of 31.0 to 31.9 in adult         Routine general medical examination at health care facility    Orders:    1 Year Follow Up In Primary Care - Wellness Exam; Future    Stage 3b chronic kidney disease (Multi)  Kidney function stable.  Continue follow-up with nephrology.         Anemia, unspecified type  Patient with mild anemia which is stable.  Will plan to recheck.  Recommend seeing GI for colonoscopy and possible EGD which patient declines.

## 2025-04-17 NOTE — ASSESSMENT & PLAN NOTE
This was noted in the left upper lobe on his coronary artery calcium score.  Plan to repeat check CT to confirm this has resolved.    Orders:    CT chest wo IV contrast; Future

## 2025-04-17 NOTE — ASSESSMENT & PLAN NOTE
Continue with healthy diet and exercise.  Screening blood work up-to-date.  Recommend colonoscopy.  Also recommend Prevnar 20, shingles vaccine, Tdap and flu and COVID vaccines.

## 2025-04-17 NOTE — ASSESSMENT & PLAN NOTE
Prescription sent for loratadine to take as needed.    Orders:    loratadine (Claritin) 10 mg tablet; Take 1 tablet (10 mg) by mouth once daily.

## 2025-04-17 NOTE — ASSESSMENT & PLAN NOTE
Patient with mild anemia which is stable.  Will plan to recheck.  Recommend seeing GI for colonoscopy and possible EGD which patient declines.

## 2025-05-05 ENCOUNTER — TELEPHONE (OUTPATIENT)
Dept: PRIMARY CARE | Facility: CLINIC | Age: 70
End: 2025-05-05
Payer: MEDICARE

## 2025-05-05 NOTE — TELEPHONE ENCOUNTER
Informed patient  He said he took BP Sitting this morning 127/68  I advised Dr Cortez would like you to take standing and sitting  He then said oh ok then tell him my BP standing would be like 130/70    He said he would like to go back on the BP med he was taking and Blood sugars have been out of range.

## 2025-05-05 NOTE — TELEPHONE ENCOUNTER
Patient says that medication Chlorthalidone 25mg is making him dizzy & he feels like he is drunk all of the time. He is wondering if he can be put back on his old medication or take half of a dose instead.     Says that he was on a hydro pill before and would like to be put back on that. He wanted this message sent specifically to you and not Dr. Cortez

## 2025-06-27 DIAGNOSIS — I10 PRIMARY HYPERTENSION: ICD-10-CM

## 2025-06-27 DIAGNOSIS — I71.21 ANEURYSM OF ASCENDING AORTA WITHOUT RUPTURE: ICD-10-CM

## 2025-07-01 RX ORDER — CARVEDILOL 6.25 MG/1
6.25 TABLET ORAL 2 TIMES DAILY
Qty: 180 TABLET | Refills: 0 | Status: SHIPPED | OUTPATIENT
Start: 2025-07-01 | End: 2025-09-29

## 2025-07-01 NOTE — TELEPHONE ENCOUNTER
Pt stated he did not want to sched at this time but he will schedule when he comes in to see DR. Jacobson on 7/17.

## 2025-07-15 ENCOUNTER — TELEPHONE (OUTPATIENT)
Dept: PRIMARY CARE | Facility: CLINIC | Age: 70
End: 2025-07-15
Payer: MEDICARE

## 2025-07-15 LAB
25(OH)D3+25(OH)D2 SERPL-MCNC: 32 NG/ML (ref 30–100)
ALBUMIN SERPL-MCNC: 4.3 G/DL (ref 3.6–5.1)
ALBUMIN/CREAT UR: 238 MG/G CREAT
ALP SERPL-CCNC: 43 U/L (ref 35–144)
ALT SERPL-CCNC: 11 U/L (ref 9–46)
ANION GAP SERPL CALCULATED.4IONS-SCNC: 8 MMOL/L (CALC) (ref 7–17)
AST SERPL-CCNC: 12 U/L (ref 10–35)
BASOPHILS # BLD AUTO: 42 CELLS/UL (ref 0–200)
BASOPHILS NFR BLD AUTO: 0.5 %
BILIRUB SERPL-MCNC: 0.5 MG/DL (ref 0.2–1.2)
BUN SERPL-MCNC: 38 MG/DL (ref 7–25)
CALCIUM SERPL-MCNC: 8.6 MG/DL (ref 8.6–10.3)
CHLORIDE SERPL-SCNC: 109 MMOL/L (ref 98–110)
CHOLEST SERPL-MCNC: 118 MG/DL
CHOLEST/HDLC SERPL: 2.9 (CALC)
CO2 SERPL-SCNC: 22 MMOL/L (ref 20–32)
CREAT SERPL-MCNC: 1.77 MG/DL (ref 0.7–1.28)
CREAT UR-MCNC: 26 MG/DL (ref 20–320)
EGFRCR SERPLBLD CKD-EPI 2021: 40 ML/MIN/1.73M2
EOSINOPHIL # BLD AUTO: 216 CELLS/UL (ref 15–500)
EOSINOPHIL NFR BLD AUTO: 2.6 %
ERYTHROCYTE [DISTWIDTH] IN BLOOD BY AUTOMATED COUNT: 13.2 % (ref 11–15)
EST. AVERAGE GLUCOSE BLD GHB EST-MCNC: 151 MG/DL
EST. AVERAGE GLUCOSE BLD GHB EST-SCNC: 8.4 MMOL/L
GLUCOSE SERPL-MCNC: 146 MG/DL (ref 65–99)
HBA1C MFR BLD: 6.9 %
HCT VFR BLD AUTO: 35.9 % (ref 38.5–50)
HDLC SERPL-MCNC: 41 MG/DL
HGB BLD-MCNC: 11.3 G/DL (ref 13.2–17.1)
LDLC SERPL CALC-MCNC: 56 MG/DL (CALC)
LYMPHOCYTES # BLD AUTO: 2349 CELLS/UL (ref 850–3900)
LYMPHOCYTES NFR BLD AUTO: 28.3 %
MCH RBC QN AUTO: 28.5 PG (ref 27–33)
MCHC RBC AUTO-ENTMCNC: 31.5 G/DL (ref 32–36)
MCV RBC AUTO: 90.4 FL (ref 80–100)
MICROALBUMIN UR-MCNC: 6.2 MG/DL
MONOCYTES # BLD AUTO: 481 CELLS/UL (ref 200–950)
MONOCYTES NFR BLD AUTO: 5.8 %
NEUTROPHILS # BLD AUTO: 5212 CELLS/UL (ref 1500–7800)
NEUTROPHILS NFR BLD AUTO: 62.8 %
NONHDLC SERPL-MCNC: 77 MG/DL (CALC)
PLATELET # BLD AUTO: 245 THOUSAND/UL (ref 140–400)
PMV BLD REES-ECKER: 9.8 FL (ref 7.5–12.5)
POTASSIUM SERPL-SCNC: 5.4 MMOL/L (ref 3.5–5.3)
PROT SERPL-MCNC: 7 G/DL (ref 6.1–8.1)
RBC # BLD AUTO: 3.97 MILLION/UL (ref 4.2–5.8)
SODIUM SERPL-SCNC: 139 MMOL/L (ref 135–146)
TRIGL SERPL-MCNC: 120 MG/DL
WBC # BLD AUTO: 8.3 THOUSAND/UL (ref 3.8–10.8)

## 2025-07-15 NOTE — TELEPHONE ENCOUNTER
----- Message from Nena Jacobson sent at 7/15/2025  3:49 PM EDT -----  Please advise patient that diabetes control has improved.  Hemoglobin A1c is 6.9.  Kidney function remains decreased but stable.  Potassium is mildly elevated.  He remains mildly anemic.  Other blood   work is okay.  Will discuss further at follow-up.  ----- Message -----  From: Clink Results In  Sent: 7/14/2025  11:35 PM EDT  To: Nena Jacobson MD

## 2025-07-17 ENCOUNTER — APPOINTMENT (OUTPATIENT)
Dept: PRIMARY CARE | Facility: CLINIC | Age: 70
End: 2025-07-17
Payer: MEDICARE

## 2025-07-17 VITALS
WEIGHT: 229.6 LBS | TEMPERATURE: 97.3 F | SYSTOLIC BLOOD PRESSURE: 136 MMHG | HEIGHT: 72 IN | BODY MASS INDEX: 31.1 KG/M2 | DIASTOLIC BLOOD PRESSURE: 78 MMHG

## 2025-07-17 DIAGNOSIS — I10 PRIMARY HYPERTENSION: ICD-10-CM

## 2025-07-17 DIAGNOSIS — J30.9 ALLERGIC RHINITIS, UNSPECIFIED SEASONALITY, UNSPECIFIED TRIGGER: ICD-10-CM

## 2025-07-17 DIAGNOSIS — R91.8 GROUND GLASS OPACITY PRESENT ON IMAGING OF LUNG: ICD-10-CM

## 2025-07-17 DIAGNOSIS — S16.1XXA STRAIN OF NECK MUSCLE, INITIAL ENCOUNTER: ICD-10-CM

## 2025-07-17 DIAGNOSIS — E11.65 UNCONTROLLED TYPE 2 DIABETES MELLITUS WITH HYPERGLYCEMIA: Primary | ICD-10-CM

## 2025-07-17 DIAGNOSIS — E11.69 HYPERLIPIDEMIA ASSOCIATED WITH TYPE 2 DIABETES MELLITUS: ICD-10-CM

## 2025-07-17 DIAGNOSIS — E11.40 CONTROLLED TYPE 2 DIABETES WITH NEUROPATHY: ICD-10-CM

## 2025-07-17 DIAGNOSIS — N18.32 STAGE 3B CHRONIC KIDNEY DISEASE (MULTI): ICD-10-CM

## 2025-07-17 DIAGNOSIS — R93.1 AGATSTON CORONARY ARTERY CALCIUM SCORE GREATER THAN 400: ICD-10-CM

## 2025-07-17 DIAGNOSIS — E78.5 HYPERLIPIDEMIA ASSOCIATED WITH TYPE 2 DIABETES MELLITUS: ICD-10-CM

## 2025-07-17 PROBLEM — M54.12 CERVICAL RADICULOPATHY: Status: ACTIVE | Noted: 2025-07-17

## 2025-07-17 PROCEDURE — 3075F SYST BP GE 130 - 139MM HG: CPT | Performed by: FAMILY MEDICINE

## 2025-07-17 PROCEDURE — 1036F TOBACCO NON-USER: CPT | Performed by: FAMILY MEDICINE

## 2025-07-17 PROCEDURE — 3078F DIAST BP <80 MM HG: CPT | Performed by: FAMILY MEDICINE

## 2025-07-17 PROCEDURE — 99214 OFFICE O/P EST MOD 30 MIN: CPT | Performed by: FAMILY MEDICINE

## 2025-07-17 PROCEDURE — 3008F BODY MASS INDEX DOCD: CPT | Performed by: FAMILY MEDICINE

## 2025-07-17 PROCEDURE — G2211 COMPLEX E/M VISIT ADD ON: HCPCS | Performed by: FAMILY MEDICINE

## 2025-07-17 PROCEDURE — 1160F RVW MEDS BY RX/DR IN RCRD: CPT | Performed by: FAMILY MEDICINE

## 2025-07-17 PROCEDURE — 1159F MED LIST DOCD IN RCRD: CPT | Performed by: FAMILY MEDICINE

## 2025-07-17 NOTE — ASSESSMENT & PLAN NOTE
Continue with aspirin and beta-blocker.  Again recommend he proceed with stress test and follow-up with cardiology.

## 2025-07-17 NOTE — PROGRESS NOTES
Subjective   Patient ID: David Ortiz is a 70 y.o. male who presents for Follow-up (3 month follow up.  Pt states that he has been a little depressed lately due to issues with his grandson. ).  Patient presents today for follow-up on his chronic medical problems.  He reports that overall he has been doing okay.  His grandson has been having some issues with concerns of death and this has been stressful for him.  He states that otherwise he has been feeling okay.  He states that about 6 months ago he lifted something heavy and has had some pain in his neck that radiates into the upper back.  He mostly notices this when he is lying down.  He has tried changing pillows without improvement.  He denies any chest pain or shortness of breath or abdominal pain.  He continues to have numbness in his feet.  He has not had the EMG done yet that neurology ordered.  He does feel that the gabapentin is helpful.  He denies any other concerns.  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   Tobacco Use    Smoking status: Never    Smokeless tobacco: Never   Vaping Use    Vaping status: Never Used   Substance and Sexual Activity    Alcohol use: Yes     Comment: a few servings 1-2 times a week    Drug use: Never    Sexual activity: Not on file   Other Topics Concern    Not on file   Social History Narrative    Not on file     Social Drivers of Health     Financial Resource Strain: Not on file   Food Insecurity: Not on file   Transportation Needs: Not on file   Physical Activity: Not on file   Stress: Not on file   Social Connections: Not on file   Intimate Partner Violence: Not on file   Housing Stability: Not on file     Current Medications[1]  Family History[2]  Review of Systems  Immunization History    Administered Date(s) Administered    Influenza Whole 12/20/2006    Influenza, Unspecified 11/20/2002    Td vaccine, age 7 years and older (TDVAX) 01/01/2000       Review of Systems negative except as noted in HPI and Chief complaint.     Objective                 /78   Temp 36.3 °C (97.3 °F)   Ht 1.829 m (6')   Wt 104 kg (229 lb 9.6 oz)   BMI 31.14 kg/m²    Physical Exam  Vitals reviewed.   HENT:      Head: Normocephalic and atraumatic.      Right Ear: Tympanic membrane normal.      Left Ear: Tympanic membrane normal.      Nose: Nose normal.      Mouth/Throat:      Pharynx: Oropharynx is clear.     Eyes:      Extraocular Movements: Extraocular movements intact.      Conjunctiva/sclera: Conjunctivae normal.      Pupils: Pupils are equal, round, and reactive to light.       Cardiovascular:      Rate and Rhythm: Normal rate and regular rhythm.      Pulses: Normal pulses.   Pulmonary:      Effort: Pulmonary effort is normal.      Breath sounds: Normal breath sounds.   Abdominal:      General: There is no distension.      Palpations: Abdomen is soft.      Tenderness: There is no abdominal tenderness.     Musculoskeletal:         General: Normal range of motion.      Cervical back: Normal range of motion and neck supple.      Right lower leg: No edema.      Left lower leg: No edema.   Lymphadenopathy:      Cervical: No cervical adenopathy.     Neurological:      General: No focal deficit present.      Mental Status: He is alert.       Results for orders placed or performed in visit on 04/17/25 (from the past 96 hours)   Vitamin D 25-Hydroxy,Total (for eval of Vitamin D levels)   Result Value Ref Range    VITAMIN D,25-OH,TOTAL,IA 32 30 - 100 ng/mL   Albumin-Creatinine Ratio, Urine Random   Result Value Ref Range    CREATININE, RANDOM URINE 26 20 - 320 mg/dL    ALBUMIN, URINE 6.2 See Note: mg/dL    ALBUMIN/CREATININE RATIO, RANDOM URINE 238 (H) <30 mg/g creat   Hemoglobin A1C   Result Value Ref Range     HEMOGLOBIN A1c 6.9 (H) <5.7 %    eAG (mg/dL) 151 mg/dL    eAG (mmol/L) 8.4 mmol/L   Lipid Panel   Result Value Ref Range    CHOLESTEROL, TOTAL 118 <200 mg/dL    HDL CHOLESTEROL 41 > OR = 40 mg/dL    TRIGLYCERIDES 120 <150 mg/dL    LDL-CHOLESTEROL 56 mg/dL (calc)    CHOL/HDLC RATIO 2.9 <5.0 (calc)    NON HDL CHOLESTEROL 77 <130 mg/dL (calc)   Comprehensive Metabolic Panel   Result Value Ref Range    GLUCOSE 146 (H) 65 - 99 mg/dL    UREA NITROGEN (BUN) 38 (H) 7 - 25 mg/dL    CREATININE 1.77 (H) 0.70 - 1.28 mg/dL    EGFR 40 (L) > OR = 60 mL/min/1.73m2    SODIUM 139 135 - 146 mmol/L    POTASSIUM 5.4 (H) 3.5 - 5.3 mmol/L    CHLORIDE 109 98 - 110 mmol/L    CARBON DIOXIDE 22 20 - 32 mmol/L    ELECTROLYTE BALANCE 8 7 - 17 mmol/L (calc)    CALCIUM 8.6 8.6 - 10.3 mg/dL    PROTEIN, TOTAL 7.0 6.1 - 8.1 g/dL    ALBUMIN 4.3 3.6 - 5.1 g/dL    BILIRUBIN, TOTAL 0.5 0.2 - 1.2 mg/dL    ALKALINE PHOSPHATASE 43 35 - 144 U/L    AST 12 10 - 35 U/L    ALT 11 9 - 46 U/L   CBC and Auto Differential   Result Value Ref Range    WHITE BLOOD CELL COUNT 8.3 3.8 - 10.8 Thousand/uL    RED BLOOD CELL COUNT 3.97 (L) 4.20 - 5.80 Million/uL    HEMOGLOBIN 11.3 (L) 13.2 - 17.1 g/dL    HEMATOCRIT 35.9 (L) 38.5 - 50.0 %    MCV 90.4 80.0 - 100.0 fL    MCH 28.5 27.0 - 33.0 pg    MCHC 31.5 (L) 32.0 - 36.0 g/dL    RDW 13.2 11.0 - 15.0 %    PLATELET COUNT 245 140 - 400 Thousand/uL    MPV 9.8 7.5 - 12.5 fL    ABSOLUTE NEUTROPHILS 5,212 1,500 - 7,800 cells/uL    ABSOLUTE LYMPHOCYTES 2,349 850 - 3,900 cells/uL    ABSOLUTE MONOCYTES 481 200 - 950 cells/uL    ABSOLUTE EOSINOPHILS 216 15 - 500 cells/uL    ABSOLUTE BASOPHILS 42 0 - 200 cells/uL    NEUTROPHILS 62.8 %    LYMPHOCYTES 28.3 %    MONOCYTES 5.8 %    EOSINOPHILS 2.6 %    BASOPHILS 0.5 %     Lab Results   Component Value Date    WBC 8.3 07/14/2025    HGB 11.3 (L) 07/14/2025    HCT 35.9 (L) 07/14/2025    MCV 90.4 07/14/2025     07/14/2025     Lab Results   Component Value Date    GLUCOSE 146 (H)  "07/14/2025    CALCIUM 8.6 07/14/2025     07/14/2025    K 5.4 (H) 07/14/2025    CO2 22 07/14/2025     07/14/2025    BUN 38 (H) 07/14/2025    CREATININE 1.77 (H) 07/14/2025     Lab Results   Component Value Date    CHOL 118 07/14/2025    CHOL 138 04/11/2025    CHOL 127 10/10/2024     Lab Results   Component Value Date    HDL 41 07/14/2025    HDL 45 04/11/2025    HDL 45.2 10/10/2024     Lab Results   Component Value Date    LDLCALC 56 07/14/2025    LDLCALC 69 04/11/2025    LDLCALC 58 10/10/2024     Lab Results   Component Value Date    TRIG 120 07/14/2025    TRIG 154 (H) 04/11/2025    TRIG 120 10/10/2024     No components found for: \"CHOLHDL\"   Lab Results   Component Value Date    TSH 1.39 10/10/2024      Lab Results   Component Value Date    HGBA1C 6.9 (H) 07/14/2025      Lab Results   Component Value Date    ALBUMINUR 6.2 07/14/2025      Assessment/Plan   Problem List Items Addressed This Visit       Controlled type 2 diabetes with neuropathy    Improved diabetes control.  Continue with current medications.  See ophthalmology.  Check feet.  Plan to follow-up in 3 to 4 months and check blood work at that time.         Primary hypertension    Blood pressure controlled.  Continue with carvedilol amlodipine and benazepril.  Follow-up in a few months to reevaluate.         Stage 3b chronic kidney disease (Multi)    Kidney function stable.  Recommend he follow-up with nephrology and proceed with renal ultrasound.         Relevant Orders    Vitamin D 25-Hydroxy,Total (for eval of Vitamin D levels)    Hyperlipidemia associated with type 2 diabetes mellitus    Cholesterol is well-controlled.  Continue with statin.         Agatston coronary artery calcium score greater than 400    Continue with aspirin and beta-blocker.  Again recommend he proceed with stress test and follow-up with cardiology.         Uncontrolled type 2 diabetes mellitus with hyperglycemia - Primary    Relevant Orders    Albumin-Creatinine " Ratio, Urine Random    Hemoglobin A1C    Lipid Panel    Comprehensive Metabolic Panel    CBC and Auto Differential    Ground glass opacity present on imaging of lung    Again recommend patient proceed with repeat chest CT.         Allergic rhinitis    Patient was seen last month by another physician for sinus issues.  He used Flonase and Augmentin and states that his symptoms resolved.  He stopped the Flonase and states that they returned.  Recommend resuming the Flonase.  He declines seeing ENT at this point.  Would recommend ENT evaluation if symptoms persist.         Cervical strain    Patient with cervical strain on the right side.  Recommend checking x-ray of the neck and proceeding with physical therapy.  Patient declines.  He is going to try sleeping in different positions and will follow-up if this persist.                      [1]   Current Outpatient Medications   Medication Sig Dispense Refill    amLODIPine-benazepriL (Lotrel) 10-40 mg capsule Take 1 capsule by mouth once daily. 90 capsule 1    aspirin 81 mg chewable tablet Chew 1 tablet (81 mg) once daily. 90 tablet 3    carvedilol (Coreg) 6.25 mg tablet Take 1 tablet (6.25 mg) by mouth 2 times a day. 180 tablet 0    cyanocobalamin (Vitamin B-12) 500 mcg tablet Take by mouth once daily. 2500 mg daily      dapagliflozin propanediol (Farxiga) 10 mg tablet Take 1 tablet (10 mg) by mouth once daily. 100 tablet 3    flash glucose sensor kit (FreeStyle Cindy 2 Sensor) kit USE 1 SENSOR EVERY 14 DAYS 6 each 1    gabapentin (Neurontin) 300 mg capsule Take 1 capsule (300 mg) by mouth 3 times a day. 270 capsule 1    loratadine (Claritin) 10 mg tablet Take 1 tablet (10 mg) by mouth once daily. 30 tablet 2    rosuvastatin (Crestor) 5 mg tablet Take 1 tablet by mouth once daily 90 tablet 1    sildenafil (Viagra) 100 mg tablet Take 1 tablet (100 mg) by mouth if needed for erectile dysfunction. 10 tablet 2     No current facility-administered medications for this visit.    [2]   Family History  Problem Relation Name Age of Onset    Heart attack Mother      No Known Problems Father

## 2025-07-17 NOTE — ASSESSMENT & PLAN NOTE
Patient with cervical strain on the right side.  Recommend checking x-ray of the neck and proceeding with physical therapy.  Patient declines.  He is going to try sleeping in different positions and will follow-up if this persist.

## 2025-07-17 NOTE — ASSESSMENT & PLAN NOTE
Improved diabetes control.  Continue with current medications.  See ophthalmology.  Check feet.  Plan to follow-up in 3 to 4 months and check blood work at that time.

## 2025-07-17 NOTE — ASSESSMENT & PLAN NOTE
Blood pressure controlled.  Continue with carvedilol amlodipine and benazepril.  Follow-up in a few months to reevaluate.

## 2025-07-17 NOTE — ASSESSMENT & PLAN NOTE
Patient was seen last month by another physician for sinus issues.  He used Flonase and Augmentin and states that his symptoms resolved.  He stopped the Flonase and states that they returned.  Recommend resuming the Flonase.  He declines seeing ENT at this point.  Would recommend ENT evaluation if symptoms persist.

## 2025-08-19 DIAGNOSIS — E78.5 HYPERLIPIDEMIA, UNSPECIFIED HYPERLIPIDEMIA TYPE: ICD-10-CM

## 2025-08-20 RX ORDER — ROSUVASTATIN CALCIUM 5 MG/1
5 TABLET, COATED ORAL DAILY
Qty: 90 TABLET | Refills: 1 | Status: SHIPPED | OUTPATIENT
Start: 2025-08-20

## 2025-11-17 ENCOUNTER — APPOINTMENT (OUTPATIENT)
Dept: PRIMARY CARE | Facility: CLINIC | Age: 70
End: 2025-11-17
Payer: MEDICARE